# Patient Record
Sex: MALE | Race: WHITE | NOT HISPANIC OR LATINO | URBAN - METROPOLITAN AREA
[De-identification: names, ages, dates, MRNs, and addresses within clinical notes are randomized per-mention and may not be internally consistent; named-entity substitution may affect disease eponyms.]

---

## 2022-08-17 ENCOUNTER — HOSPITAL ENCOUNTER (INPATIENT)
Facility: HOSPITAL | Age: 57
LOS: 3 days | Discharge: HOME | End: 2022-08-20
Attending: INTERNAL MEDICINE | Admitting: INTERNAL MEDICINE

## 2022-08-17 ENCOUNTER — APPOINTMENT (OUTPATIENT)
Dept: MRI IMAGING | Facility: HOSPITAL | Age: 57
End: 2022-08-17

## 2022-08-17 ENCOUNTER — APPOINTMENT (OUTPATIENT)
Dept: CT IMAGING | Facility: HOSPITAL | Age: 57
End: 2022-08-17

## 2022-08-17 PROBLEM — I61.9 ICH (INTRACEREBRAL HEMORRHAGE): Status: ACTIVE | Noted: 2022-08-17

## 2022-08-17 LAB
ALBUMIN SERPL-MCNC: 4.3 G/DL (ref 3.5–5.2)
ALBUMIN/GLOB SERPL: 2 G/DL
ALP SERPL-CCNC: 67 U/L (ref 39–117)
ALT SERPL W P-5'-P-CCNC: 17 U/L (ref 1–41)
ANION GAP SERPL CALCULATED.3IONS-SCNC: 11 MMOL/L (ref 5–15)
AST SERPL-CCNC: 17 U/L (ref 1–40)
BILIRUB SERPL-MCNC: 0.4 MG/DL (ref 0–1.2)
BUN SERPL-MCNC: 14 MG/DL (ref 6–20)
BUN/CREAT SERPL: 17.5 (ref 7–25)
CALCIUM SPEC-SCNC: 8.9 MG/DL (ref 8.6–10.5)
CHLORIDE SERPL-SCNC: 105 MMOL/L (ref 98–107)
CHOLEST SERPL-MCNC: 140 MG/DL (ref 0–200)
CO2 SERPL-SCNC: 27 MMOL/L (ref 22–29)
CREAT SERPL-MCNC: 0.8 MG/DL (ref 0.76–1.27)
DEPRECATED RDW RBC AUTO: 40.9 FL (ref 37–54)
EGFRCR SERPLBLD CKD-EPI 2021: 101.9 ML/MIN/1.73
ERYTHROCYTE [DISTWIDTH] IN BLOOD BY AUTOMATED COUNT: 12.9 % (ref 12.3–15.4)
GLOBULIN UR ELPH-MCNC: 2.1 GM/DL
GLUCOSE BLDC GLUCOMTR-MCNC: 89 MG/DL (ref 70–130)
GLUCOSE SERPL-MCNC: 99 MG/DL (ref 65–99)
HBA1C MFR BLD: 5.5 % (ref 4.8–5.6)
HCT VFR BLD AUTO: 39.2 % (ref 37.5–51)
HDLC SERPL-MCNC: 28 MG/DL (ref 40–60)
HGB BLD-MCNC: 14.1 G/DL (ref 13–17.7)
INR PPP: 1.11 (ref 0.9–1.1)
LDLC SERPL CALC-MCNC: 87 MG/DL (ref 0–100)
LDLC/HDLC SERPL: 3.02 {RATIO}
MAGNESIUM SERPL-MCNC: 2 MG/DL (ref 1.6–2.6)
MCH RBC QN AUTO: 31.8 PG (ref 26.6–33)
MCHC RBC AUTO-ENTMCNC: 36 G/DL (ref 31.5–35.7)
MCV RBC AUTO: 88.3 FL (ref 79–97)
PHOSPHATE SERPL-MCNC: 3.5 MG/DL (ref 2.5–4.5)
PLATELET # BLD AUTO: 186 10*3/MM3 (ref 140–450)
PMV BLD AUTO: 10.1 FL (ref 6–12)
POTASSIUM SERPL-SCNC: 3 MMOL/L (ref 3.5–5.2)
PROT SERPL-MCNC: 6.4 G/DL (ref 6–8.5)
PROTHROMBIN TIME: 14.2 SECONDS (ref 11.7–14.2)
RBC # BLD AUTO: 4.44 10*6/MM3 (ref 4.14–5.8)
SARS-COV-2 ORF1AB RESP QL NAA+PROBE: NOT DETECTED
SODIUM SERPL-SCNC: 143 MMOL/L (ref 136–145)
TRIGL SERPL-MCNC: 137 MG/DL (ref 0–150)
TSH SERPL DL<=0.05 MIU/L-ACNC: 2.56 UIU/ML (ref 0.27–4.2)
VLDLC SERPL-MCNC: 25 MG/DL (ref 5–40)
WBC NRBC COR # BLD: 5.95 10*3/MM3 (ref 3.4–10.8)

## 2022-08-17 PROCEDURE — 82962 GLUCOSE BLOOD TEST: CPT

## 2022-08-17 PROCEDURE — 80061 LIPID PANEL: CPT | Performed by: INTERNAL MEDICINE

## 2022-08-17 PROCEDURE — 99222 1ST HOSP IP/OBS MODERATE 55: CPT | Performed by: PSYCHIATRY & NEUROLOGY

## 2022-08-17 PROCEDURE — 70498 CT ANGIOGRAPHY NECK: CPT

## 2022-08-17 PROCEDURE — 83036 HEMOGLOBIN GLYCOSYLATED A1C: CPT | Performed by: INTERNAL MEDICINE

## 2022-08-17 PROCEDURE — U0004 COV-19 TEST NON-CDC HGH THRU: HCPCS | Performed by: INTERNAL MEDICINE

## 2022-08-17 PROCEDURE — 80053 COMPREHEN METABOLIC PANEL: CPT | Performed by: INTERNAL MEDICINE

## 2022-08-17 PROCEDURE — 0 GADOBENATE DIMEGLUMINE 529 MG/ML SOLUTION: Performed by: INTERNAL MEDICINE

## 2022-08-17 PROCEDURE — 85610 PROTHROMBIN TIME: CPT | Performed by: INTERNAL MEDICINE

## 2022-08-17 PROCEDURE — 83735 ASSAY OF MAGNESIUM: CPT | Performed by: INTERNAL MEDICINE

## 2022-08-17 PROCEDURE — 70496 CT ANGIOGRAPHY HEAD: CPT

## 2022-08-17 PROCEDURE — 84443 ASSAY THYROID STIM HORMONE: CPT | Performed by: INTERNAL MEDICINE

## 2022-08-17 PROCEDURE — 0 IOPAMIDOL PER 1 ML: Performed by: INTERNAL MEDICINE

## 2022-08-17 PROCEDURE — 70553 MRI BRAIN STEM W/O & W/DYE: CPT

## 2022-08-17 PROCEDURE — 25010000002 HYDRALAZINE PER 20 MG: Performed by: INTERNAL MEDICINE

## 2022-08-17 PROCEDURE — 85027 COMPLETE CBC AUTOMATED: CPT | Performed by: INTERNAL MEDICINE

## 2022-08-17 PROCEDURE — 99221 1ST HOSP IP/OBS SF/LOW 40: CPT | Performed by: NURSE PRACTITIONER

## 2022-08-17 PROCEDURE — 84100 ASSAY OF PHOSPHORUS: CPT | Performed by: INTERNAL MEDICINE

## 2022-08-17 PROCEDURE — A9577 INJ MULTIHANCE: HCPCS | Performed by: INTERNAL MEDICINE

## 2022-08-17 RX ORDER — MAGNESIUM SULFATE HEPTAHYDRATE 40 MG/ML
2 INJECTION, SOLUTION INTRAVENOUS AS NEEDED
Status: DISCONTINUED | OUTPATIENT
Start: 2022-08-17 | End: 2022-08-20 | Stop reason: HOSPADM

## 2022-08-17 RX ORDER — ASPIRIN 81 MG/1
81 TABLET, CHEWABLE ORAL DAILY
COMMUNITY

## 2022-08-17 RX ORDER — SODIUM CHLORIDE 0.9 % (FLUSH) 0.9 %
10 SYRINGE (ML) INJECTION EVERY 12 HOURS SCHEDULED
Status: DISCONTINUED | OUTPATIENT
Start: 2022-08-17 | End: 2022-08-20 | Stop reason: HOSPADM

## 2022-08-17 RX ORDER — POTASSIUM CHLORIDE 7.45 MG/ML
10 INJECTION INTRAVENOUS
Status: DISCONTINUED | OUTPATIENT
Start: 2022-08-17 | End: 2022-08-20 | Stop reason: HOSPADM

## 2022-08-17 RX ORDER — NICOTINE POLACRILEX 4 MG
15 LOZENGE BUCCAL
Status: DISCONTINUED | OUTPATIENT
Start: 2022-08-17 | End: 2022-08-20 | Stop reason: HOSPADM

## 2022-08-17 RX ORDER — ONDANSETRON 2 MG/ML
4 INJECTION INTRAMUSCULAR; INTRAVENOUS EVERY 6 HOURS PRN
Status: DISCONTINUED | OUTPATIENT
Start: 2022-08-17 | End: 2022-08-20 | Stop reason: HOSPADM

## 2022-08-17 RX ORDER — ONDANSETRON 4 MG/1
4 TABLET, FILM COATED ORAL EVERY 6 HOURS PRN
Status: DISCONTINUED | OUTPATIENT
Start: 2022-08-17 | End: 2022-08-20 | Stop reason: HOSPADM

## 2022-08-17 RX ORDER — MAGNESIUM SULFATE HEPTAHYDRATE 40 MG/ML
4 INJECTION, SOLUTION INTRAVENOUS AS NEEDED
Status: DISCONTINUED | OUTPATIENT
Start: 2022-08-17 | End: 2022-08-20 | Stop reason: HOSPADM

## 2022-08-17 RX ORDER — AMLODIPINE BESYLATE 10 MG/1
10 TABLET ORAL
Status: DISCONTINUED | OUTPATIENT
Start: 2022-08-17 | End: 2022-08-20 | Stop reason: HOSPADM

## 2022-08-17 RX ORDER — HYDRALAZINE HYDROCHLORIDE 20 MG/ML
20 INJECTION INTRAMUSCULAR; INTRAVENOUS EVERY 6 HOURS PRN
Status: DISCONTINUED | OUTPATIENT
Start: 2022-08-17 | End: 2022-08-20 | Stop reason: HOSPADM

## 2022-08-17 RX ORDER — HYDROCODONE BITARTRATE AND ACETAMINOPHEN 5; 325 MG/1; MG/1
1 TABLET ORAL EVERY 6 HOURS PRN
Status: DISCONTINUED | OUTPATIENT
Start: 2022-08-17 | End: 2022-08-20 | Stop reason: HOSPADM

## 2022-08-17 RX ORDER — POTASSIUM CHLORIDE 1.5 G/1.77G
40 POWDER, FOR SOLUTION ORAL AS NEEDED
Status: DISCONTINUED | OUTPATIENT
Start: 2022-08-17 | End: 2022-08-20 | Stop reason: HOSPADM

## 2022-08-17 RX ORDER — SODIUM CHLORIDE 0.9 % (FLUSH) 0.9 %
10 SYRINGE (ML) INJECTION AS NEEDED
Status: DISCONTINUED | OUTPATIENT
Start: 2022-08-17 | End: 2022-08-20 | Stop reason: HOSPADM

## 2022-08-17 RX ORDER — DEXTROSE MONOHYDRATE 25 G/50ML
25 INJECTION, SOLUTION INTRAVENOUS
Status: DISCONTINUED | OUTPATIENT
Start: 2022-08-17 | End: 2022-08-20 | Stop reason: HOSPADM

## 2022-08-17 RX ORDER — POTASSIUM CHLORIDE 750 MG/1
40 TABLET, FILM COATED, EXTENDED RELEASE ORAL AS NEEDED
Status: DISCONTINUED | OUTPATIENT
Start: 2022-08-17 | End: 2022-08-20 | Stop reason: HOSPADM

## 2022-08-17 RX ADMIN — Medication 10 ML: at 10:03

## 2022-08-17 RX ADMIN — POTASSIUM CHLORIDE 40 MEQ: 750 TABLET, EXTENDED RELEASE ORAL at 06:37

## 2022-08-17 RX ADMIN — POTASSIUM CHLORIDE 40 MEQ: 750 TABLET, EXTENDED RELEASE ORAL at 16:31

## 2022-08-17 RX ADMIN — SODIUM CHLORIDE 5 MG/HR: 9 INJECTION, SOLUTION INTRAVENOUS at 22:58

## 2022-08-17 RX ADMIN — HYDRALAZINE HYDROCHLORIDE 20 MG: 20 INJECTION INTRAMUSCULAR; INTRAVENOUS at 11:36

## 2022-08-17 RX ADMIN — HYDROCODONE BITARTRATE AND ACETAMINOPHEN 1 TABLET: 5; 325 TABLET ORAL at 20:14

## 2022-08-17 RX ADMIN — POTASSIUM CHLORIDE 40 MEQ: 750 TABLET, EXTENDED RELEASE ORAL at 11:39

## 2022-08-17 RX ADMIN — HYDRALAZINE HYDROCHLORIDE 20 MG: 20 INJECTION INTRAMUSCULAR; INTRAVENOUS at 18:39

## 2022-08-17 RX ADMIN — IOPAMIDOL 95 ML: 755 INJECTION, SOLUTION INTRAVENOUS at 04:21

## 2022-08-17 RX ADMIN — AMLODIPINE BESYLATE 10 MG: 10 TABLET ORAL at 10:03

## 2022-08-17 RX ADMIN — GADOBENATE DIMEGLUMINE 18 ML: 529 INJECTION, SOLUTION INTRAVENOUS at 21:19

## 2022-08-17 NOTE — CONSULTS
Stroke Consult Note    Patient Name: Gerry Campos   MRN: [id number]  Age: 57 y.o.  Sex: male  : 1965    Primary Care Physician: Provider, No Known  Referring Physician:  Provider, No Known    Handedness: Right  Race: White    Chief Complaint/Reason for Consultation: Dizziness    Subjective .  HPI: 57-year-old right-handed white male with known diagnosis of hypertension, hyperlipidemia, smoking, stroke few months ago, with residual imbalance walking, on and off dizziness, who comes in with complains of dizziness, but on further questioning it sounds more like lightheadedness, with associated right upper extremity numbness and left lower extremity numbness and pain.  He was at [facility] where he got CT head which showed a possible left insular ICH for which he was transferred to [facility].  Per patient, he has been having some ongoing issues since his stroke few months ago with some occasional imbalance walking lightheadedness, dizziness, and not feeling well.  This time he also complained of some headache, which is also on and off.    Last Known Normal Date/Time: 3 days ago EST     Review of Systems   Constitutional: Positive for fatigue.   Neurological: Positive for dizziness, light-headedness and headaches.   All other systems reviewed and are negative.     Past Medical History:   Diagnosis Date   • Hyperlipidemia    • Hypertension    • Sleep apnea    • Stroke (HCC)      Past Surgical History:   Procedure Laterality Date   • APPENDECTOMY     • BACK SURGERY       History reviewed. No pertinent family history.  Social History     Socioeconomic History   • Marital status: Single   Tobacco Use   • Smoking status: Current Every Day Smoker     Packs/day: 1.00     Years: 15.00     Pack years: 15.00     Types: Cigarettes     Start date: 2006     Allergies   Allergen Reactions   • Ciprofloxacin Rash     Prior to Admission medications    Medication Sig Start Date End Date Taking? Authorizing Provider    aspirin 81 MG chewable tablet Chew 81 mg Daily.    Provider, MD Katerina             Objective     Temp:  [97.6 °F (36.4 °C)-97.8 °F (36.6 °C)] 97.8 °F (36.6 °C)  Heart Rate:  [55-77] 59  BP: (113-151)/(60-98) 151/83  Neurological Exam  Mental Status  Awake, alert and oriented to person, place and time.Alert. Speech is normal. Language is fluent with no aphasia. Attention and concentration are normal.    Cranial Nerves  CN II: Visual fields full to confrontation.  CN III, IV, VI: Extraocular movements intact bilaterally. Normal lids and orbits bilaterally. Pupils equal round and reactive to light bilaterally.  CN V: Facial sensation is normal.  CN VII: Full and symmetric facial movement.  CN VIII: Equal hearing bilaterally.  CN IX, X: Palate elevates symmetrically. Normal gag reflex.  CN XI: Shoulder shrug strength is normal.  CN XII: Tongue midline without atrophy or fasciculations.    Motor  Normal muscle bulk throughout. No fasciculations present. Normal muscle tone.  Subtle right upper extremity weakness, otherwise okay.    Sensory  Decreased to light touch in right upper extremity and left lower extremity.     Reflexes  Deep tendon reflexes are 2+ and symmetric in all four extremities.    Coordination    No dysmetria.    Gait    Not assessed.      Physical Exam  Vitals and nursing note reviewed.   Constitutional:       Appearance: Normal appearance.   HENT:      Head: Normocephalic and atraumatic.      Mouth/Throat:      Mouth: Mucous membranes are moist.      Pharynx: Oropharynx is clear.   Eyes:      General: Lids are normal.      Extraocular Movements: Extraocular movements intact.      Pupils: Pupils are equal, round, and reactive to light.   Cardiovascular:      Rate and Rhythm: Normal rate and regular rhythm.   Pulmonary:      Effort: Pulmonary effort is normal. No respiratory distress.   Musculoskeletal:      Cervical back: Normal range of motion and neck supple.   Neurological:      Mental Status:  He is alert.      Deep Tendon Reflexes: Reflexes are normal and symmetric.   Psychiatric:         Mood and Affect: Mood normal.         Speech: Speech normal.         Behavior: Behavior normal.         Acute Stroke Data    IV Thrombolytic (TPA/Tenecteplase) Inclusion / Exclusion Criteria    Time: 13:16 EDT  Person Administering Scale: Christopher Beltre MD    Inclusion Criteria  [x]   18 years of age or greater   []   Onset of symptoms < 4.5 hours before beginning treatment (stroke onset = time patient was last seen well or without symptoms).   []   Diagnosis of acute ischemic stroke causing measurable disabling deficit (Complete Hemianopia, Any Aphasia, Visual or Sensory Extinction, Any weakness limiting sustained effort against gravity)   []   Any remaining deficit considered potentially disabling in view of patient and practitioner   Exclusion criteria (Do not proceed with Alteplase if any are checked under exclusion criteria)  []   Onset unknown or GREATER than 4.5 hours   [x]   ICH on CT/MRI   []   CT demonstrates hypodensity representing acute or subacute infarct   []   Significant head trauma or prior stroke in the previous 3 months   []   Symptoms suggestive of subarachnoid hemorrhage   []   History of un-ruptured intracranial aneurysm GREATER than 10 mm   []   Recent intracranial or intraspinal surgery within the last 3 months   []   Arterial puncture at a non-compressible site in the previous 7 days   []   Active internal bleeding   []   Acute bleeding tendency   []   Platelet count LESS than 100,000 for known hematological diseases such as leukemia, thrombocytopenia or chronic cirrhosis   []   Current use of anticoagulant with INR GREATER than 1.7 or PT GREATER than 15 seconds, aPTT GREATER than 40 seconds   []   Heparin received within 48 hours, resulting in abnormally elevated aPTT GREATER than upper limit of normal   []   Current use of direct thrombin inhibitors or direct factor Xa inhibitors in the past  48 hours   []   Elevated blood pressure refractory to treatment (systolic GREATER than 185 mm/Hg or diastolic  GREATER than 110 mm/Hg   []   Suspected infective endocarditis and aortic arch dissection   []   Current use of therapeutic treatment dose of low-molecular-weight heparin (LMWH) within the previous 24 hours   []   Structural GI malignancy or bleed   Relative exclusion for all patients  []   Only minor nondisabling symptoms   []   Pregnancy   []   Seizure at onset with postictal residual neurological impairments   []   Major surgery or previous trauma within past 14 days   []   History of previous spontaneous ICH, intracranial neoplasm, or AV malformation   []   Postpartum (within previous 14 days)   []   Recent GI or urinary tract hemorrhage (within previous 21 days)   []   Recent acute MI (within previous 3 months)   []   History of unruptured intracranial aneurysm LESS than 10 mm   []   History of ruptured intracranial aneurysm   []   Blood glucose LESS than 50 mg/dL (2.7 mmol/L)   []   Dural puncture within the last 7 days   []   Known GREATER than 10 cerebral microbleeds   Additional exclusions for patients with symptoms onset between 3 and 4.5 hours.  []   Age > 80.   []   On any anticoagulants regardless of INR  >>> Warfarin (Coumadin), Heparin, Enoxaparin (Lovenox), fondaparinux (Arixtra), bivalirudin (Angiomax), Argatroban, dabigatran (Pradaxa), rivaroxaban (Xarelto), or apixaban (Eliquis)   []   Severe stroke (NIHSS > 25).   []   History of BOTH diabetes and previous ischemic stroke.   []   The risks and benefits have been discussed with the patient or family related to the administration of IV alteplase for stroke symptoms.   []   I have discussed and reviewed the patient's case and imaging with the attending prior to IV Thrombolytic (TPA/Tenecteplase).    Time Thrombolytic administered       Hospital Meds:  Scheduled- amLODIPine, 10 mg, Oral, Q24H  insulin regular, 0-14 Units, Subcutaneous,  Q6H  sodium chloride, 10 mL, Intravenous, Q12H      Infusions- niCARdipine, 5-15 mg/hr       PRNs- dextrose  •  dextrose  •  glucagon (human recombinant)  •  hydrALAZINE  •  magnesium sulfate **OR** magnesium sulfate **OR** magnesium sulfate  •  ondansetron **OR** ondansetron  •  potassium chloride **OR** potassium chloride **OR** potassium chloride  •  potassium phosphate infusion greater than 15 mMoles **OR** potassium phosphate infusion greater than 15 mMoles **OR** potassium phosphate **OR** sodium phosphate IVPB **OR** sodium phosphate IVPB  •  sodium chloride    Functional Status Prior to Current Stroke/Kymberly Score: 2    NIH Stroke Scale  Time: 13:16 EDT  Person Administering Scale: Christopher Beltre MD    1a  Level of consciousness: 0=alert; keenly responsive   1b. LOC questions:  0=Performs both tasks correctly   1c. LOC commands: 0=Performs both tasks correctly   2.  Best Gaze: 0=normal   3.  Visual: 0=No visual loss   4. Facial Palsy: 0=Normal symmetric movement   5a.  Motor left arm: 0=No drift, limb holds 90 (or 45) degrees for full 10 seconds   5b.  Motor right arm: 0=No drift, limb holds 90 (or 45) degrees for full 10 seconds   6a. motor left le=No drift, limb holds 90 (or 45) degrees for full 10 seconds   6b  Motor right le=No drift, limb holds 90 (or 45) degrees for full 10 seconds   7. Limb Ataxia: 0=Absent   8.  Sensory: 1=Mild to moderate sensory loss; patient feels pinprick is less sharp or is dull on the affected side; there is a loss of superficial pain with pinprick but patient is aware He is being touched   9. Best Language:  0=No aphasia, normal   10. Dysarthria: 0=Normal   11. Extinction and Inattention: 0=No abnormality    Total:   1       Results Reviewed:  I have personally reviewed current lab, radiology, and data   CT head shows left posterior insular hyperdensity, which has been stable on 2 CT head.  CT angiogram of head and neck shows left more than right ICA atherosclerosis  at the origin, without any significant stenosis.  Intracranial vessel looks okay.            Assessment/Plan:      1. Intracranial hemorrhage.  Reviewed his CT head which shows some hyperdensity in the left posterior insular region, which could very well be cortical calcification post his stroke.  Recommend to get MRI brain with and without contrast.  Neurosurgery is also involved, who is getting repeat CT head tomorrow morning.  Keep his systolic blood pressure less than 140s.  Okay to continue aspirin 81 mg from my standpoint.  2. Essential hypertension.  Many of his ongoing symptoms could very well be because of fluctuating blood pressures.  Encouraged him to check his blood pressure on a daily basis.  3. Smoking greater than 40 pack years.  Encouraged him to quit smoking completely.  4. Increase activity as tolerated.  5. Okay to transfer out of ICU.    Case was discussed with patient, nursing and will let the primary team know.  Thank you for the consult.          Christopher Beltre MD  August 17, 2022  13:16 EDT

## 2022-08-17 NOTE — H&P
Group: [facility]         CRITICAL CARE ADMISSION    Patient Identification:  Gerry Campos  57 y.o.  male  1965  [id number]              CC: Ataxia, loss of balance, right arm and left leg weakness    History of Present Illness:  56 yo male presents from [facility] in transfer for neurosurgical consultation. He complains of 3 weeks of progressive but intermittent weakness in his RUE and left leg, associated with imbalance and ataxia, but denies fall or head trauma. Has not had double vision nor seizures. Denies chest pain or SOA. Has had previous thalamic stroke. I discussed case over the phone with Dr Jag Ross  He presented there with /95.  Has also been having some headaches on and off for weeks now.  Outside records from [facility] reviewed.  The patient is supposed to be on statin, angiotensin receptor blockers and aspirin.    Review of Systems   Constitutional: Positive for fatigue. Negative for diaphoresis and fever.   HENT: Negative for ear pain and sore throat.    Eyes: Negative for pain and visual disturbance.   Respiratory: Negative for cough and shortness of breath.    Cardiovascular: Negative for chest pain and leg swelling.   Gastrointestinal: Negative for abdominal pain and diarrhea.   Endocrine: Negative for cold intolerance and polyuria.   Genitourinary: Negative for dysuria and hematuria.   Musculoskeletal: Negative for joint swelling and myalgias.   Skin: Negative for rash and wound.   Neurological: Positive for dizziness, weakness and headaches. Negative for speech difficulty and numbness.   Hematological: Negative for adenopathy. Does not bruise/bleed easily.   Psychiatric/Behavioral: Negative for agitation and confusion.       Past Medical History:  Past Medical History:   Diagnosis Date   • Hyperlipidemia    • Hypertension    • Sleep apnea    • Stroke (HCC)    History of rheumatoid arthritis  Chronic shoulder pain  Sleep apnea  Stomach  ulcer  Smoker      Past Surgical History:  Past Surgical History:   Procedure Laterality Date   • APPENDECTOMY     • BACK SURGERY     Splenectomy  Bilateral knee surgery  Right shoulder surgery  Lumbar fusion  Cervical fusion     Home Meds:  Medications Prior to Admission   Medication Sig Dispense Refill Last Dose   • aspirin 81 MG chewable tablet Chew 81 mg Daily.          Allergies:  Allergies   Allergen Reactions   • Ciprofloxacin Rash       Social History:   Social History     Socioeconomic History   • Marital status: Single   Smoker.    Family History:  Positive for hypertension, heart attacks and cancer in father.  Positive for heart disease in mother    Physical Exam:  There were no vitals taken for this visit. There is no height or weight on file to calculate BMI.      Physical Exam  Constitutional:       General: He is not in acute distress.     Appearance: He is well-developed.   HENT:      Right Ear: External ear normal.      Left Ear: External ear normal.      Nose: Nose normal.   Eyes:      General: No scleral icterus.     Conjunctiva/sclera: Conjunctivae normal.      Pupils: Pupils are equal, round, and reactive to light.   Neck:      Thyroid: No thyromegaly.      Vascular: No JVD.   Cardiovascular:      Rate and Rhythm: Normal rate and regular rhythm.      Heart sounds: No murmur heard.     Comments: No edema  Pulmonary:      Effort: Pulmonary effort is normal.      Breath sounds: Rhonchi present. No wheezing or rales.   Abdominal:      General: There is no distension.      Palpations: Abdomen is soft.      Comments: No liver or spleen enlargment palpable   Musculoskeletal:         General: No deformity. Normal range of motion.      Cervical back: Neck supple. No rigidity.      Comments: No deformity in all 4 extrem   Skin:     Findings: No erythema or rash.      Comments: No palpable nodules   Neurological:      General: No focal deficit present.      Mental Status: He is alert and oriented to  person, place, and time.      Cranial Nerves: No cranial nerve deficit.      Motor: No weakness.      Comments: Some dysmetria finger-to-nose assessment.  Gait not assessed since patient was in bed   Psychiatric:         Mood and Affect: Mood normal.         Thought Content: Thought content normal.         LABS:  No results found for: COVID19    Imaging: I could not visualize images of scan performed at outside in the hospital.  Accompanying report states head CT scan images show intracranial hemorrhage within the posterior left insular region, no significant mass-effect, no subarachnoid hemorrhage.      Assessment:  Intracranial hemorrhage left insular region  Dizziness, ataxia and headaches  Hypertension, uncontrolled  Hyperlipidemia  Smoker      Recommendations:  Admit to ICU.  Consult Dr. Lawson Iyer, neurosurgeon.  Start Cardene and maintain systolic blood pressure below 150 mmHg.  Neurochecks every hour following ICU protocol for intracranial hemorrhage.  Avoid all antiplatelets or anticoagulants.  Check blood glucose every 6 hours and correct with sliding scale of insulin.  Obtain CBC, CMP, magnesium, phosphorus and coagulation times.  Repeat CT of the head without contrast now.  Defer decision for antiepileptics to neurosurgery.        Mendoza Lara MD  8/17/2022  02:22 EDT      Much of this encounter note is an electronic transcription/translation of spoken language to printed text using Dragon Software.

## 2022-08-17 NOTE — CONSULTS
"[facility]   Consult Note    Patient Name: Gerry Campos  : 1965  MRN: [id number]  Primary Care Physician:  Provider, No Known  Referring Physician: No Known Provider  Date of admission: 2022    Consults  Subjective   Subjective     Reason for Consult/ Chief Complaint: Intracerebral hemorrhage     History of Present Illness  Gerry Campos is a 57 y.o. male with a history of sleep apnea, hypertension, hyperlipidemia, RA, stomach ulcers.  He also has a history of prior lumbar and cervical fusions. ,  Splenectomy, appendectomy and bilateral knee surgeries.  He was transferred to [facility] from [facility] for intracerebral hemorrhage.  He presented [facility] ER yesterday for complaints of worsening, intermittent headaches for the last 3 weeks.  He had also been experiencing some weakness in his right upper extremity and pain in his right hand.  He was complaining of numbness in the right hand and the last 2 fingers of the right hand as well.  He also had complained of difficulty with balance and dizziness when he stands.  He had been having some left groin discomfort and left anterior thigh pain with standing as well.  He denies any seizures, syncopal episodes, nausea, vomiting, visual loss although he does state that he has been having some intermittent episodes of blurred vision stating \"I need some new glasses.\"  He also denies any episodes of confusion or change in mental status.  He denies any history of falls or trauma.      He was diagnosed with a stroke approximately 6 months ago.  He states that he was treated with Plavix, 81 mg aspirin.  He states that 3 months ago he was instructed by his physician to reduce to 81 mg aspirin only.  He states that he has been compliant with this medication.  He also notes a history of hypertension but he is unsure if he was taking any medications for this.     CT head without contrast performed at [facility] revealed a left " "insular cortex intracerebral hemorrhage with no significant mass-effect.  The patient was transferred to [facility] where CTA of the head and neck were performed.  There has been no change in the appearance of the intracerebral hemorrhage and there is no finding of large vessel occlusion.  Neurosurgery has been asked to evaluate and make further recommendations.      Review of Systems   Constitutional: Positive for activity change.   Eyes: Positive for visual disturbance (blurred vision \"I need to get new glasses\").   Respiratory: Negative.  Negative for cough and shortness of breath.    Cardiovascular: Negative.    Gastrointestinal: Negative.  Negative for abdominal pain, nausea and vomiting.   Endocrine: Negative.    Genitourinary: Negative.  Negative for difficulty urinating.   Musculoskeletal: Positive for arthralgias (chronic R hand/wrist pain) and back pain (Chronic with L groin and anterior thigh pain ).   Skin: Negative.    Allergic/Immunologic: Negative.    Neurological: Positive for dizziness (occurs upon standing), weakness (L sided weeks with stroke 6 mos ago. Placed on Plavix and ASA. Only taking ASA for the last 3 months. ) and headaches (intermittent, over the last several weeks). Negative for tremors, seizures and syncope. Speech difficulty: Intermittent speech difficulty during stroke 6 mos ago - now resolved.    Hematological: Negative.    Psychiatric/Behavioral: Negative.         Personal History     Past Medical History:   Diagnosis Date   • Hyperlipidemia    • Hypertension    • Sleep apnea    • Stroke (HCC)        Past Surgical History:   Procedure Laterality Date   • APPENDECTOMY     • BACK SURGERY         Family History: family history is not on file. Otherwise pertinent FHx was reviewed and not pertinent to current issue.    Social History:  reports that he has been smoking cigarettes. He started smoking about 16 years ago. He has a 15.00 pack-year smoking history. He does not " have any smokeless tobacco history on file. Prior history of cocaine use - quit 20 years ago.     Home Medications:   aspirin    Allergies:  Allergies   Allergen Reactions   • Ciprofloxacin Rash       Objective    Objective     Vitals:  Temp:  [97.6 °F (36.4 °C)] 97.6 °F (36.4 °C)  Heart Rate:  [55-77] 56  BP: (113-147)/(60-98) 132/80    Physical Exam  Vitals reviewed.   Constitutional:       General: He is not in acute distress.     Appearance: Normal appearance. He is well-developed and normal weight. He is not ill-appearing, toxic-appearing or diaphoretic.   HENT:      Head: Normocephalic and atraumatic.      Nose: Nose normal.      Mouth/Throat:      Mouth: Mucous membranes are moist.   Eyes:      General:         Right eye: No discharge.         Left eye: No discharge.      Extraocular Movements: Extraocular movements intact.      Conjunctiva/sclera: Conjunctivae normal.      Pupils: Pupils are equal, round, and reactive to light.   Neck:      Trachea: No tracheal deviation.   Cardiovascular:      Rate and Rhythm: Normal rate.   Pulmonary:      Effort: Pulmonary effort is normal. No respiratory distress.   Abdominal:      General: There is no distension.      Palpations: Abdomen is soft.      Tenderness: There is no abdominal tenderness.   Musculoskeletal:         General: No tenderness. Normal range of motion.      Cervical back: Normal range of motion and neck supple.      Right lower leg: No edema.      Left lower leg: No edema.   Skin:     General: Skin is warm and dry.      Findings: No erythema.   Neurological:      Mental Status: He is alert and oriented to person, place, and time.      GCS: GCS eye subscore is 4. GCS verbal subscore is 5. GCS motor subscore is 6.      Sensory: No sensory deficit.      Motor: No abnormal muscle tone.      Coordination: Coordination normal.      Deep Tendon Reflexes: Reflexes are normal and symmetric. Reflexes normal.      Comments: AA&O x 3.  Speech is normal.  Converses  appropriately.  PERRL. EOM's intact. Face symmetric. Tongue midline without fasiculations or atrophy. Sensation equal and intact throughout the face.  Hearing is intact bilaterally to finger rustle.  Negative pronator drift. Gait not tested due to risk for injury.   No motor or sensory deficits. DTR's normal.   Psychiatric:         Behavior: Behavior is cooperative.         Thought Content: Thought content normal.       Result Review    Result Review:  I have personally reviewed the results from the time of this admission to 8/17/2022 10:44 EDT and agree with these findings:  [x]  Laboratory list / accordion  []  Microbiology  [x]  Radiology  []  EKG/Telemetry   []  Cardiology/Vascular   []  Pathology   []  Old records  []  Other:  Most notable findings include:     CTA HEAD and NECK 8/17/22 0418     Acute curvilinear left insular cortex intraparenchymal hemorrhage no significant mass-effect.  CTA neck reveals less than 50% stenosis of the left cervical ICA secondary to calcified plaque.    .  Results from last 7 days   Lab Units 08/17/22  0321   WBC 10*3/mm3 5.95   HEMOGLOBIN g/dL 14.1   HEMATOCRIT % 39.2   PLATELETS 10*3/mm3 186     .  Results from last 7 days   Lab Units 08/17/22  0321   SODIUM mmol/L 143   POTASSIUM mmol/L 3.0*   CHLORIDE mmol/L 105   CO2 mmol/L 27.0   BUN mg/dL 14   CREATININE mg/dL 0.80   GLUCOSE mg/dL 99   CALCIUM mg/dL 8.9         Assessment & Plan   Assessment / Plan     Brief Patient Summary:  Gerry Campos is a 57 y.o. male who presented to [facility] as transfer from [facility] for c/o dizziness, gait imbalance, weakness/numbness/pain in R arm and L leg (he has a history of prior cervical and lumbar spine surgery). CT head at [facility] revealed findings of small L inuslar cortex ICH. He has a history of prior stroke 6 months ago. He was treated with 81 mg ASA and plavix daily for 3 months. As of 3 mos ago, treatment was reduced to daily 81 mg ASA only. He  takes no other medication. He was hypertensive on arrival with 's. He is currently on Cardene drip for BP management in the ICU.     Active Hospital Problems:  Active Hospital Problems    Diagnosis    • ICH (intracerebral hemorrhage) (HCC)    • Hypertension    • History of Stroke (HCC)      Plan:      Continue to observe non-operatively  Consult neurology due to recent history of stroke  Manage BP; Cardene to keep SBP < 160.   Recheck head CT in am  Consult PT/OT    CONSTANCE Tidwell

## 2022-08-18 ENCOUNTER — APPOINTMENT (OUTPATIENT)
Dept: CT IMAGING | Facility: HOSPITAL | Age: 57
End: 2022-08-18

## 2022-08-18 LAB
ANION GAP SERPL CALCULATED.3IONS-SCNC: 10 MMOL/L (ref 5–15)
BUN SERPL-MCNC: 15 MG/DL (ref 6–20)
BUN/CREAT SERPL: 21.4 (ref 7–25)
CALCIUM SPEC-SCNC: 8.9 MG/DL (ref 8.6–10.5)
CHLORIDE SERPL-SCNC: 107 MMOL/L (ref 98–107)
CO2 SERPL-SCNC: 23 MMOL/L (ref 22–29)
CREAT SERPL-MCNC: 0.7 MG/DL (ref 0.76–1.27)
DEPRECATED RDW RBC AUTO: 43.5 FL (ref 37–54)
EGFRCR SERPLBLD CKD-EPI 2021: 106.1 ML/MIN/1.73
ERYTHROCYTE [DISTWIDTH] IN BLOOD BY AUTOMATED COUNT: 13.1 % (ref 12.3–15.4)
GLUCOSE BLDC GLUCOMTR-MCNC: 100 MG/DL (ref 70–130)
GLUCOSE SERPL-MCNC: 100 MG/DL (ref 65–99)
HCT VFR BLD AUTO: 42.5 % (ref 37.5–51)
HGB BLD-MCNC: 14.4 G/DL (ref 13–17.7)
MCH RBC QN AUTO: 31.1 PG (ref 26.6–33)
MCHC RBC AUTO-ENTMCNC: 33.9 G/DL (ref 31.5–35.7)
MCV RBC AUTO: 91.8 FL (ref 79–97)
PLATELET # BLD AUTO: 207 10*3/MM3 (ref 140–450)
PMV BLD AUTO: 10.3 FL (ref 6–12)
POTASSIUM SERPL-SCNC: 3.6 MMOL/L (ref 3.5–5.2)
RBC # BLD AUTO: 4.63 10*6/MM3 (ref 4.14–5.8)
SODIUM SERPL-SCNC: 140 MMOL/L (ref 136–145)
WBC NRBC COR # BLD: 6.47 10*3/MM3 (ref 3.4–10.8)

## 2022-08-18 PROCEDURE — 85027 COMPLETE CBC AUTOMATED: CPT | Performed by: INTERNAL MEDICINE

## 2022-08-18 PROCEDURE — 82962 GLUCOSE BLOOD TEST: CPT

## 2022-08-18 PROCEDURE — 97162 PT EVAL MOD COMPLEX 30 MIN: CPT

## 2022-08-18 PROCEDURE — 97530 THERAPEUTIC ACTIVITIES: CPT

## 2022-08-18 PROCEDURE — 97110 THERAPEUTIC EXERCISES: CPT

## 2022-08-18 PROCEDURE — 80048 BASIC METABOLIC PNL TOTAL CA: CPT | Performed by: INTERNAL MEDICINE

## 2022-08-18 PROCEDURE — 99232 SBSQ HOSP IP/OBS MODERATE 35: CPT | Performed by: NURSE PRACTITIONER

## 2022-08-18 PROCEDURE — 70450 CT HEAD/BRAIN W/O DYE: CPT

## 2022-08-18 PROCEDURE — 25010000002 HYDRALAZINE PER 20 MG: Performed by: INTERNAL MEDICINE

## 2022-08-18 PROCEDURE — 97165 OT EVAL LOW COMPLEX 30 MIN: CPT

## 2022-08-18 PROCEDURE — 99233 SBSQ HOSP IP/OBS HIGH 50: CPT | Performed by: PSYCHIATRY & NEUROLOGY

## 2022-08-18 RX ORDER — ATORVASTATIN CALCIUM 20 MG/1
40 TABLET, FILM COATED ORAL NIGHTLY
Status: DISCONTINUED | OUTPATIENT
Start: 2022-08-18 | End: 2022-08-20 | Stop reason: HOSPADM

## 2022-08-18 RX ORDER — ASPIRIN 81 MG/1
81 TABLET ORAL DAILY
Status: DISCONTINUED | OUTPATIENT
Start: 2022-08-18 | End: 2022-08-20 | Stop reason: HOSPADM

## 2022-08-18 RX ORDER — LISINOPRIL 10 MG/1
10 TABLET ORAL
Status: DISCONTINUED | OUTPATIENT
Start: 2022-08-18 | End: 2022-08-20 | Stop reason: HOSPADM

## 2022-08-18 RX ADMIN — ASPIRIN 81 MG: 81 TABLET, COATED ORAL at 17:53

## 2022-08-18 RX ADMIN — HYDRALAZINE HYDROCHLORIDE 20 MG: 20 INJECTION INTRAMUSCULAR; INTRAVENOUS at 18:05

## 2022-08-18 RX ADMIN — SODIUM CHLORIDE 5 MG/HR: 9 INJECTION, SOLUTION INTRAVENOUS at 18:36

## 2022-08-18 RX ADMIN — SODIUM CHLORIDE 10 MG/HR: 9 INJECTION, SOLUTION INTRAVENOUS at 01:30

## 2022-08-18 RX ADMIN — HYDROCODONE BITARTRATE AND ACETAMINOPHEN 1 TABLET: 5; 325 TABLET ORAL at 11:16

## 2022-08-18 RX ADMIN — LISINOPRIL 10 MG: 10 TABLET ORAL at 10:41

## 2022-08-18 RX ADMIN — ATORVASTATIN CALCIUM 40 MG: 20 TABLET, FILM COATED ORAL at 20:56

## 2022-08-18 RX ADMIN — AMLODIPINE BESYLATE 10 MG: 10 TABLET ORAL at 08:26

## 2022-08-18 RX ADMIN — SODIUM CHLORIDE 5 MG/HR: 9 INJECTION, SOLUTION INTRAVENOUS at 21:08

## 2022-08-18 RX ADMIN — Medication 10 ML: at 20:57

## 2022-08-18 RX ADMIN — HYDROCODONE BITARTRATE AND ACETAMINOPHEN 1 TABLET: 5; 325 TABLET ORAL at 20:56

## 2022-08-18 RX ADMIN — Medication 10 ML: at 08:26

## 2022-08-18 NOTE — PROGRESS NOTES
NEUROSURGERY PROGRESS NOTE     LOS: 1 day   Patient Care Team:  Provider, No Known as PCP - General    Chief Complaint:  Dizziness. F/U visit for ICH  Subjective       Interval History: Continues to c/o dizziness with position change. Denies nausea or vomiting. Denies headache. Neurology saw patient yesterday and ordered MRI brain.     History taken from: patient chart    Objective      Vital Signs  Temp:  [97.8 °F (36.6 °C)-98.7 °F (37.1 °C)] 98.7 °F (37.1 °C)  Heart Rate:  [55-89] 85  Resp:  [14] 14  BP: (106-174)/(50-93) 134/82     Physical Exam:    AA&O x 3. PERRL.   Face symmetric, tongue midline. Speech normal.   Follows commands x 4 extremities.   No drift. No motor or sensory deficits.        Results Review:     I reviewed the patient's new clinical results.  I reviewed the patient's new imaging results and agree with the interpretation.  CT Head and MRI brain results reviewed with Dr. Melvin and also discussed with the patient at bedside.        CT HEAD WO CONTRAST 8/18/22    The hemorrhage in the left insular cortex is stable and unchanged from yesterday's CT.    MRI BRAIN W/WO CONTRAST 8/17/22    There is a large subacute left temporal hemorrhage with mild surrounding edema is stable and unchanged measuring 8 x 8 x 6 mm.  This is likely hemorrhagic conversion from an infarct.  There are also foci of non-hemorrhagic acute infarcts located in the right temporal and left cerebellar regions.      Assessment & Plan         ICH (intracerebral hemorrhage) (HCC)    Hypertension    History of Stroke (HCC)      Nothing to offer from neurosurgical standpoint.   Neurology following and managing stroke.   No neurosurgical follow up needed. Please call for questions.       Elizabeth Donovan, CONSTANCE  08/18/22  09:44 EDT      ADDENDUM:     Spoke with Dr. Melvin. He has approved patient to start ASA now but would prefer waiting until post hemorrhage day 5 to start Plavix. This would be August 22nd. I informed Dr. Beltre with  stroke neurology of these recommendations as well.

## 2022-08-18 NOTE — PLAN OF CARE
Goal Outcome Evaluation:    Patient is a pleasant 57 y.o. male admitted to [facility] for Acute to subacute multifocal multiple vascular territorial strokes and ICH on 8/17/2022. PMHx includes thalamic CVA. Patient is independent at baseline and lives at home alone- no prior use of AD. SV. Today, patient performed bed mobility with SV, required CGA-Tramaine for transfers, and ambulated CGA with a RW- unsteady without AD and currently recommend use of RW to ensure safety. Balance deficits noted. Patient may benefit from skilled PT services acutely to address functional deficits as well as improve level of independence prior to discharge. Anticipate patient returning home with  PT or OPPT to further address balance impairments upon DC.

## 2022-08-18 NOTE — PROGRESS NOTES
Stroke Progress Note       Chief Complaint: Dizziness    Subjective    Subjective     Subjective:  No acute issues overnight.  Patient continues to have left leg pain, and ongoing lightheadedness which has been ongoing since his stroke few months ago.  Denies any headache this morning.    Review of Systems   Constitutional: Positive for fatigue.   Neurological: Positive for dizziness, light-headedness.    All other systems reviewed and are negative.        Objective    Objective      Temp:  [97.8 °F (36.6 °C)-98.7 °F (37.1 °C)] 98.6 °F (37 °C)  Heart Rate:  [55-90] 72  Resp:  [14] 14  BP: (106-174)/(50-93) 144/83    Neurological Exam  Mental Status  Awake, alert and oriented to person, place and time.Alert. Speech is normal. Language is fluent with no aphasia. Attention and concentration are normal.     Cranial Nerves  CN II: Visual fields full to confrontation.  CN III, IV, VI: Extraocular movements intact bilaterally. Normal lids and orbits bilaterally. Pupils equal round and reactive to light bilaterally.  CN V: Facial sensation is normal.  CN VII: Full and symmetric facial movement.  CN VIII: Equal hearing bilaterally.  CN IX, X: Palate elevates symmetrically. Normal gag reflex.  CN XI: Shoulder shrug strength is normal.  CN XII: Tongue midline without atrophy or fasciculations.     Motor  Normal muscle bulk throughout. No fasciculations present. Normal muscle tone.  Subtle right upper extremity weakness, otherwise okay.     Sensory  Decreased to light touch in right upper extremity and left lower extremity.      Reflexes  Deep tendon reflexes are 2+ and symmetric in all four extremities.     Coordination     No dysmetria.     Gait     Gait was broad-based, small steps, requiring help.        Physical Exam  Vitals and nursing note reviewed.   Constitutional:       Appearance: Normal appearance.   HENT:      Head: Normocephalic and atraumatic.      Mouth/Throat:      Mouth: Mucous membranes are moist.       Pharynx: Oropharynx is clear.   Eyes:      General: Lids are normal.      Extraocular Movements: Extraocular movements intact.      Pupils: Pupils are equal, round, and reactive to light.   Cardiovascular:      Rate and Rhythm: Normal rate and regular rhythm.   Pulmonary:      Effort: Pulmonary effort is normal. No respiratory distress.   Musculoskeletal:      Cervical back: Normal range of motion and neck supple.   Neurological:      Mental Status: He is alert.      Deep Tendon Reflexes: Reflexes are normal and symmetric.   Psychiatric:         Mood and Affect: Mood normal.         Speech: Speech normal.         Behavior: Behavior normal.     Results Review:    I reviewed the patient's new clinical results.  MRI brain shows him to have pretty extensive white matter disease, in addition to a small acute right temporal lobe stroke, subacute left cerebellar stroke and left temporal ICH.    Labs were reviewed.              Assessment/Plan     Assessment/Plan:  57-year-old right-handed white male with known diagnosis of hypertension, hyperlipidemia, smoking, stroke few months ago, with residual imbalance walking, on and off dizziness, who comes in with complains of dizziness, but on further questioning it sounds more like lightheadedness, with associated right upper extremity numbness and left lower extremity numbness and pain.      1. Acute to subacute multifocal multiple vascular territorial strokes.  Patient likely has multiple lacunar events, in addition to his extensive white matter disease, secondary to uncontrolled risk factors.  Recommend to start him on aspirin 81 mg, Plavix 75 mg daily if okay with neurosurgery.  Start him on 40 mg of Lipitor daily as well.  Get a 2D echocardiogram.  Zio patch on discharge to look for other cardiac sources of emboli.  2. Intracranial hemorrhage.  Likely hemorrhagic transformation of a small stroke.  Continue to keep his blood pressures less than 140s.  Start antiplatelet  agents when okay with neurosurgery.  3. Essential hypertension.  Likely cause for his multiple symptoms.  Encouraged him to check blood pressure on a daily basis.  Goal systolic blood pressure of 100-1 40s.  4. Smoking greater than 40 pack years.  Encouraged him to quit smoking.  5. Okay to transfer him out of ICU.    Case was discussed with patient, nursing and will let the primary team know.  Thank you for the consult.      Christopher Beltre MD  08/18/22  11:44 EDT

## 2022-08-18 NOTE — PROGRESS NOTES
[facility] INPATIENT PROGRESS NOTE         [facility] INTENSIVE CARE    2022      PATIENT IDENTIFICATION:  Name: Gerry Campos ADMIT: 2022   : 1965  PCP: Provider, No Known    MRN: [id number] LOS: 1 days   AGE/SEX: 57 y.o. male  ROOM: [facility]                     LOS 1    Reason for visit: Intracranial hemorrhage      SUBJECTIVE:      Comfortably.  Required Cardene overnight but is off now.  Discussed with nursing staff.  Complains of left leg weakness and some numbness in his right hand.    Objective   OBJECTIVE:    Vital Sign Min/Max for last 24 hours  Temp  Min: 97.8 °F (36.6 °C)  Max: 98.7 °F (37.1 °C)   BP  Min: 106/57  Max: 174/50   Pulse  Min: 55  Max: 89   Resp  Min: 14  Max: 14   SpO2  Min: 85 %  Max: 100 %   No data recorded   Weight  Min: 88.5 kg (195 lb)  Max: 89.1 kg (196 lb 6.9 oz)                         Body mass index is 27.41 kg/m².    Intake/Output Summary (Last 24 hours) at 2022 0841  Last data filed at 2022 0500  Gross per 24 hour   Intake 350 ml   Output 1850 ml   Net -1500 ml         Exam:  GEN:  No distress, appears stated age  EYES:   PERRL, anicteric sclerae  ENT:    External ears/nose normal, OP clear  NECK:  No adenopathy, midline trachea  LUNGS: Normal chest on inspection, palpation and auscultation  CV:  Normal S1S2, without murmur  ABD:  Nontender, nondistended, no hepatosplenomegaly, +BS  EXT:  No edema.  No cyanosis or clubbing.  No mottling and normal cap refill.  Left leg weakness    Assessment     Scheduled meds:  amLODIPine, 10 mg, Oral, Q24H  insulin regular, 0-14 Units, Subcutaneous, Q6H  sodium chloride, 10 mL, Intravenous, Q12H      IV meds:                      niCARdipine, 5-15 mg/hr, Last Rate: Stopped (22 0300)      Data Review:  Results from last 7 days   Lab Units 22  0431 22  0321   SODIUM mmol/L 140 143   POTASSIUM mmol/L 3.6 3.0*   CHLORIDE mmol/L 107 105   CO2 mmol/L 23.0 27.0   BUN mg/dL 15 14   CREATININE  mg/dL 0.70* 0.80   GLUCOSE mg/dL 100* 99   CALCIUM mg/dL 8.9 8.9         Estimated Creatinine Clearance: 143.2 mL/min (A) (by C-G formula based on SCr of 0.7 mg/dL (L)).  Results from last 7 days   Lab Units 08/18/22  0431 08/17/22  0321   WBC 10*3/mm3 6.47 5.95   HEMOGLOBIN g/dL 14.4 14.1   PLATELETS 10*3/mm3 207 186     Results from last 7 days   Lab Units 08/17/22  0321   INR  1.11*     Results from last 7 days   Lab Units 08/17/22  0321   ALT (SGPT) U/L 17   AST (SGOT) U/L 17                 Hemoglobin A1C   Date/Time Value Ref Range Status   08/17/2022 0321 5.50 4.80 - 5.60 % Final     Glucose   Date/Time Value Ref Range Status   08/18/2022 0004 100 70 - 130 mg/dL Final     Comment:     Meter: [id number] : LARRY Stratton RN   08/17/2022 1128 89 70 - 130 mg/dL Final     Comment:     Meter: [id number] : [id number] Immanuel ARDON     CT head 8/18 reviewed: Shows similar hemorrhage in left periventricular white matter.  No herniation.        Microbiology reviewed                Active Hospital Problems    Diagnosis  POA   • ICH (intracerebral hemorrhage) (HCC) [I61.9]  Yes   • Hypertension [I10]  Yes   • History of Stroke (HCC) [I63.9]  Yes      Resolved Hospital Problems   No resolved problems to display.         ASSESSMENT:    Intracranial hemorrhage left insular region  Dizziness, ataxia and headaches  Hypertension, uncontrolled  Hyperlipidemia  Smoker          PLAN:    Control blood pressure.  Add additional p.o. blood pressure medications and wean off Cardene.  Control glucose.  MRI and repeat CT head reviewed.  Aspirin and statin for secondary stroke prevention when okay with neurology and neurosurgery service.  Mechanical DVT prophylaxis.  Can transfer out of intensive care from my standpoint.  PT/OT    Discussed with multidisciplinary ICU team on rounds this morning.      Dylan Burton MD  Pulmonary and Critical Care Medicine  [facility], [facility]  8/18/2022    08:41  EDT

## 2022-08-18 NOTE — PROGRESS NOTES
Discharge Planning Assessment  [facility]     Patient Name: Gerry Campos  MRN: [id number]  Today's Date: 8/18/2022    Admit Date: 8/17/2022     Discharge Needs Assessment     Row Name 08/18/22 1027       Living Environment    People in Home alone    Current Living Arrangements home    Potentially Unsafe Housing Conditions unable to assess    Primary Care Provided by self    Provides Primary Care For no one    Family Caregiver if Needed child(santino), adult    Quality of Family Relationships unable to assess    Able to Return to Prior Arrangements yes       Resource/Environmental Concerns    Resource/Environmental Concerns none    Transportation Concerns none       Transition Planning    Patient/Family Anticipates Transition to home    Patient/Family Anticipated Services at Transition other (see comments)    Transportation Anticipated family or friend will provide       Discharge Needs Assessment    Current Outpatient/Agency/Support Group other (see comments)    Equipment Currently Used at Home none    Concerns to be Addressed home safety    Anticipated Changes Related to Illness other (see comments)    Equipment Needed After Discharge other (see comments)    Provided Post Acute Provider List? Refused    Provided Post Acute Provider Quality & Resource List? Refused               Discharge Plan     Row Name 08/18/22 1029       Plan    Plan Home  Pt declines all referrals    Plan Comments CCP spoke to patient at bedside.  Face sheet verified.  CCP role explained and discharge planning discussed.  His son Veronica, [phone] his emergency contact.   Pt‘s PCP is Dr. Dr. Oli Castellanos.   Pt lives in a mobile home alone.      He is independent with ADL’s.  He uses no DME to ambulate.  He obtains his medications from [facility].  He has no history of Home Health.   He has no past rehab stays.  Plan is undetermined.  Pt states he can go home and care for himself.   CCP discussed home health;  patient declined a referral.  CCP discussed acute rehab; patient declined a referral.   Spoke to patient about home safety.  He agrees to speak with his son.  CCP following for therapy evaluations              Continued Care and Services - Admitted Since 8/17/2022     Destination     Service Provider Request Status Selected Services Address Phone Fax Patient Preferred    [facility]  Pending - No Request Sent N/A [address] [facility] [id number] [phone] -- --              Expected Discharge Date and Time     Expected Discharge Date Expected Discharge Time    Aug 22, 2022          Demographic Summary    No documentation.                Functional Status    No documentation.                Psychosocial    No documentation.                Abuse/Neglect    No documentation.                Legal    No documentation.                Substance Abuse    No documentation.                Patient Forms    No documentation.                   Olya Sands RN

## 2022-08-18 NOTE — THERAPY EVALUATION
Patient Name: Gerry Campos  : 1965    MRN: [id number]                              Today's Date: 2022       Admit Date: 2022    Visit Dx: No diagnosis found.  Patient Active Problem List   Diagnosis   • ICH (intracerebral hemorrhage) (HCC)   • Hypertension   • History of Stroke (HCC)     Past Medical History:   Diagnosis Date   • Hyperlipidemia    • Hypertension    • Sleep apnea    • Stroke (HCC)      Past Surgical History:   Procedure Laterality Date   • APPENDECTOMY     • BACK SURGERY        General Information     Row Name 22 1327          Physical Therapy Time and Intention    Document Type evaluation  -MS     Mode of Treatment physical therapy  -MS     Row Name 22 1327          General Information    Patient Profile Reviewed yes  -MS     Prior Level of Function independent:;all household mobility  hx of balance issues, reported diff with coordination and balance PTA for unknown reason  -MS     Existing Precautions/Restrictions fall  -MS     Barriers to Rehab medically complex  -MS     Row Name 22 1327          Living Environment    People in Home alone  -MS     Row Name 22 1327          Cognition    Orientation Status (Cognition) oriented x 4  -MS     Row Name 22 1327          Safety Issues, Functional Mobility    Safety Issues Affecting Function (Mobility) positioning of assistive device;sequencing abilities;awareness of need for assistance;judgment  -MS     Impairments Affecting Function (Mobility) balance;endurance/activity tolerance;strength;postural/trunk control;range of motion (ROM)  -MS     Comment, Safety Issues/Impairments (Mobility) Gait belt and non skid socks donned.  -MS           User Key  (r) = Recorded By, (t) = Taken By, (c) = Cosigned By    Initials Name Provider Type    MS Olya Cosby PT Physical Therapist               Mobility     Row Name 22 1329          Bed Mobility    Bed Mobility supine-sit;sit-supine  -MS      Supine-Sit [place] (Bed Mobility) supervision;verbal cues  -MS     Assistive Device (Bed Mobility) bed rails;head of bed elevated  -MS     Comment, (Bed Mobility) SV for sitting balance.  -MS     Row Name 08/18/22 1329          Sit-Stand Transfer    Sit-Stand [place] (Transfers) verbal cues;nonverbal cues (demo/gesture);contact guard;minimum assist (75% patient effort)  -MS     Assistive Device (Sit-Stand Transfers) walker, front-wheeled  -MS     Row Name 08/18/22 1329          Gait/Stairs (Locomotion)    [place] Level (Gait) contact guard;verbal cues;nonverbal cues (demo/gesture)  -MS     Assistive Device (Gait) walker, front-wheeled  -MS     Distance in Feet (Gait) 10', 55'  -MS     Deviations/Abnormal Patterns (Gait) sarah decreased;gait speed decreased;ataxic  -MS     Comment, (Gait/Stairs) Unsteady throughout, implemented RW and balance improved, cues needed for placement, agreeable to sitting UI  -MS           User Key  (r) = Recorded By, (t) = Taken By, (c) = Cosigned By    Initials Name Provider Type    MS Olya Cosby, PT Physical Therapist               Obj/Interventions     Row Name 08/18/22 1330          Range of Motion Comprehensive    Comment, General Range of Motion B LEs grossly WFL  -MS     Row Name 08/18/22 1330          Strength Comprehensive (MMT)    Comment, General Manual Muscle Testing (MMT) Assessment B LEs grossly at least 3/5  -MS     Row Name 08/18/22 1330          Motor Skills    Therapeutic Exercise --  Seated LAQs x 10 reps  -MS     Row Name 08/18/22 1330          Balance    Balance Assessment sitting static balance;standing static balance  -MS     Static Sitting Balance supervision  -MS     Position, Sitting Balance sitting edge of bed  -MS     Static Standing Balance contact guard;minimal assist  -MS     Position/Device Used, Standing Balance supported;walker, rolling  -MS     Row Name 08/18/22 1330          Sensory Assessment (Somatosensory)    Sensory  Assessment (Somatosensory) sensation intact  -MS           User Key  (r) = Recorded By, (t) = Taken By, (c) = Cosigned By    Initials Name Provider Type    MS CosbyOlya, PT Physical Therapist               Goals/Plan     Row Name 08/18/22 1332          Bed Mobility Goal 1 (PT)    Activity/Assistive Device (Bed Mobility Goal 1, PT) bed mobility activities, all  -MS     [place] Level/Cues Needed (Bed Mobility Goal 1, PT) independent  -MS     Time Frame (Bed Mobility Goal 1, PT) 1 week  -MS     Row Name 08/18/22 1332          Transfer Goal 1 (PT)    Activity/Assistive Device (Transfer Goal 1, PT) transfers, all  -MS     [place] Level/Cues Needed (Transfer Goal 1, PT) supervision required  -MS     Time Frame (Transfer Goal 1, PT) 1 week  -MS     Row Name 08/18/22 1332          Gait Training Goal 1 (PT)    Activity/Assistive Device (Gait Training Goal 1, PT) gait (walking locomotion)  -MS     [place] Level (Gait Training Goal 1, PT) supervision required  -MS     Distance (Gait Training Goal 1, PT) 150'  -MS     Time Frame (Gait Training Goal 1, PT) 1 week  -MS     Row Name 08/18/22 1332          Therapy Assessment/Plan (PT)    Planned Therapy Interventions (PT) balance training;bed mobility training;gait training;home exercise program;postural re-education;patient/family education;ROM (range of motion);stair training;strengthening;transfer training  -MS           User Key  (r) = Recorded By, (t) = Taken By, (c) = Cosigned By    Initials Name Provider Type    MS CosbyOlya, PT Physical Therapist               Clinical Impression     Row Name 08/18/22 1331          Pain    Pretreatment Pain Rating 0/10 - no pain  -MS     Row Name 08/18/22 1331          Therapy Assessment/Plan (PT)    Rehab Potential (PT) good, to achieve stated therapy goals  -MS     Criteria for Skilled Interventions Met (PT) yes;meets criteria  -MS     Therapy Frequency (PT) 5 times/wk  -MS     Row Name 08/18/22 1331           Vital Signs    O2 Delivery Pre Treatment room air  -MS     Row Name 08/18/22 1331          Positioning and Restraints    Pre-Treatment Position in bed  -MS     Post Treatment Position chair  -MS     In Chair reclined;call light within reach;encouraged to call for assist;exit alarm on  -MS           User Key  (r) = Recorded By, (t) = Taken By, (c) = Cosigned By    Initials Name Provider Type    MS CosbyOlya, PT Physical Therapist               Outcome Measures     Row Name 08/18/22 1331          How much help from another person do you currently need...    Turning from your back to your side while in flat bed without using bedrails? 4  -MS     Moving from lying on back to sitting on the side of a flat bed without bedrails? 3  -MS     Moving to and from a bed to a chair (including a wheelchair)? 3  -MS     Standing up from a chair using your arms (e.g., wheelchair, bedside chair)? 3  -MS     Climbing 3-5 steps with a railing? 1  -MS     To walk in hospital room? 3  -MS     AM-PAC 6 Clicks Score (PT) 17  -MS     Highest level of mobility 5 --> Static standing  -MS     Row Name 08/18/22 1331          Functional Assessment    Outcome Measure Options AM-PAC 6 Clicks Basic Mobility (PT)  -MS           User Key  (r) = Recorded By, (t) = Taken By, (c) = Cosigned By    Initials Name Provider Type    MS LagossOlya, PT Physical Therapist                               PT Recommendation and Plan  Planned Therapy Interventions (PT): balance training, bed mobility training, gait training, home exercise program, postural re-education, patient/family education, ROM (range of motion), stair training, strengthening, transfer training        Time Calculation:    PT Charges     Row Name 08/18/22 1327             Time Calculation    Start Time 0938  -MS      Stop Time 0953  -MS      Time Calculation (min) 15 min  -MS      PT Received On 08/18/22  -MS      PT - Next Appointment 08/19/22  -MS      PT Goal Re-Cert Due  Date 08/25/22  -MS              Time Calculation- PT    Total Timed Code Minutes- PT 10 minute(s)  -MS            User Key  (r) = Recorded By, (t) = Taken By, (c) = Cosigned By    Initials Name Provider Type    Olya Galindo, PT Physical Therapist              Therapy Charges for Today     Code Description Service Date Service Provider Modifiers Qty    23566411137  PT EVAL MOD COMPLEXITY 2 8/18/2022 Olya Cosby, PT GP 1    [id number]  PT THER PROC EA 15 MIN 8/18/2022 Olya Cosby, PT GP 1          PT G-Codes  Outcome Measure Options: AM-PAC 6 Clicks Basic Mobility (PT)  AM-PAC 6 Clicks Score (PT): 17    Olya Cosby, PT  8/18/2022

## 2022-08-18 NOTE — DISCHARGE PLACEMENT REQUEST
"Gerry Parks (57 y.o. Male)             Date of Birth   1965    Social Security Number       Address   [address] [place] [address]    Home Phone   [phone]    MRN   [id number]       Christian   Unknown    Marital Status   Single                            Admission Date   8/17/22    Admission Type   Urgent    Admitting Provider   Mendoza Lara MD    Attending Provider   Mendoza Lara MD    Department, Room/Bed   [facility] INTENSIVE CARE, I374/1       Discharge Date       Discharge Disposition       Discharge Destination                               Attending Provider: Mendoza Lara MD    Allergies: Ciprofloxacin    Isolation: None   Infection: None   Code Status: CPR   Advance Care Planning Activity    Ht: 180.3 cm (71\")   Wt: 89.1 kg (196 lb 6.9 oz)    Admission Cmt: None   Principal Problem: None                Active Insurance as of 8/17/2022     Primary Coverage     Payor Plan Insurance Group Employer/Plan Group    [place] [employer] MEDICARE REPLACEMENT [employer]IONS MEDICARE REPLACEMENT [id number]     Payor Plan Address Payor Plan Phone Number Payor Plan Fax Number Effective Dates    [facility] [phone]  8/1/2022 - None Entered    [address]       [facility] [address]       Subscriber Name Subscriber Birth Date Member ID       GERRY PARKS 1965 [id number]           Secondary Coverage     Payor Plan Insurance Group Employer/Plan Group    [place]      Payor Plan Address Payor Plan Phone Number Payor Plan Fax Number Effective Dates    [address]   1/1/2021 - None Entered    [place] [address]       Subscriber Name Subscriber Birth Date Member ID       GERRY PARKS 1965 [id number]                 Emergency Contacts      (Rel.) Home Phone Work Phone Mobile Phone    Roosevelt Parks (Son) -- -- [phone]              "

## 2022-08-18 NOTE — THERAPY EVALUATION
Patient Name: Gerry Campos  : 1965    MRN: [id number]                              Today's Date: 2022       Admit Date: 2022    Visit Dx: No diagnosis found.  Patient Active Problem List   Diagnosis   • ICH (intracerebral hemorrhage) (HCC)   • Hypertension   • History of Stroke (HCC)     Past Medical History:   Diagnosis Date   • Hyperlipidemia    • Hypertension    • Sleep apnea    • Stroke (HCC)      Past Surgical History:   Procedure Laterality Date   • APPENDECTOMY     • BACK SURGERY        General Information     Row Name 22 1338          OT Time and Intention    Document Type evaluation  -KN     Mode of Treatment occupational therapy  -     Row Name 22 1338          General Information    Patient Profile Reviewed yes  -KN     Existing Precautions/Restrictions fall  -     Barriers to Rehab medically complex  -     Row Name 22 1338          Living Environment    People in Home alone  -     Row Name 22 1338          Cognition    Orientation Status (Cognition) oriented x 4  -     Row Name 22 133          Safety Issues, Functional Mobility    Impairments Affecting Function (Mobility) balance;endurance/activity tolerance;strength;postural/trunk control;range of motion (ROM)  -           User Key  (r) = Recorded By, (t) = Taken By, (c) = Cosigned By    Initials Name Provider Type    Stu Riddle OT Occupational Therapist                 Mobility/ADL's     Row Name 22 1339          Bed Mobility    Bed Mobility supine-sit;sit-supine  -KN     Supine-Sit [place] (Bed Mobility) supervision;verbal cues  -KN     Sit-Supine [place] (Bed Mobility) supervision;verbal cues  -KN     Assistive Device (Bed Mobility) bed rails;head of bed elevated  -     Row Name 22 1339          Transfers    Transfers stand-sit transfer;sit-stand transfer  -KN     Sit-Stand [place] (Transfers) verbal cues;nonverbal cues (demo/gesture);contact  guard;minimum assist (75% patient effort)  -     Stand-Sit [place] (Transfers) verbal cues;nonverbal cues (demo/gesture);contact guard;minimum assist (75% patient effort)  -     Row Name 08/18/22 1339          Sit-Stand Transfer    Assistive Device (Sit-Stand Transfers) walker, front-wheeled  -     Row Name 08/18/22 1339          Activities of Daily Living    BADL Assessment/Intervention bathing;upper body dressing;lower body dressing;grooming;feeding;toileting  -[facility] Name 08/18/22 1339          Bathing Assessment/Intervention    [place] Level (Bathing) bathing skills;minimum assist (75% patient effort)  -[facility] Name 08/18/22 1339          Upper Body Dressing Assessment/Training    [place] Level (Upper Body Dressing) upper body dressing skills;set up  -[facility] Name 08/18/22 1339          Lower Body Dressing Assessment/Training    [place] Level (Lower Body Dressing) lower body dressing skills;minimum assist (75% patient effort)  -[facility] Name 08/18/22 1339          Grooming Assessment/Training    [place] Level (Grooming) grooming skills;set up  -[facility] Name 08/18/22 1339          Self-Feeding Assessment/Training    [place] Level (Feeding) feeding skills;independent  -[facility] Name 08/18/22 1339          Toileting Assessment/Training    [place] Level (Toileting) toileting skills;minimum assist (75% patient effort)  -           User Key  (r) = Recorded By, (t) = Taken By, (c) = Cosigned By    Initials Name Provider Type    Stu Riddle OT Occupational Therapist               Obj/Interventions     [facility] Name 08/18/22 1340          Sensory Assessment (Somatosensory)    Sensory Assessment (Somatosensory) sensation intact  -KN     Row Name 08/18/22 1340          Vision Assessment/Intervention    Visual Impairment/Limitations WFL  -[facility] Name 08/18/22 1340          Range of Motion Comprehensive    General Range of Motion no range of motion deficits  identified  -KN     Row Name 08/18/22 1340          Strength Comprehensive (MMT)    General Manual Muscle Testing (MMT) Assessment no strength deficits identified  -KN     Row Name 08/18/22 1340          Motor Skills    Motor Skills coordination;functional endurance  -KN     Coordination WFL  -KN     Functional Endurance F+  -KN     Row Name 08/18/22 1340          Balance    Balance Assessment sitting static balance;sitting dynamic balance  -KN     Static Sitting Balance supervision  -KN     Dynamic Sitting Balance supervision  -KN     Position, Sitting Balance sitting edge of bed  -KN     Static Standing Balance contact guard;minimal assist  -KN     Position/Device Used, Standing Balance supported;walker, rolling  -KN           User Key  (r) = Recorded By, (t) = Taken By, (c) = Cosigned By    Initials Name Provider Type    Sut Riddle, OT Occupational Therapist               Goals/Plan     [facility] Name 08/18/22 1349          Bed Mobility Goal 1 (OT)    Activity/Assistive Device (Bed Mobility Goal 1, OT) bed mobility activities, all  -KN     [place] Level/Cues Needed (Bed Mobility Goal 1, OT) modified independence  -KN     Time Frame (Bed Mobility Goal 1, OT) short term goal (STG);2 weeks  -     Row Name 08/18/22 1349          Transfer Goal 1 (OT)    Activity/Assistive Device (Transfer Goal 1, OT) transfers, all  -KN     [place] Level/Cues Needed (Transfer Goal 1, OT) modified independence  -KN     Time Frame (Transfer Goal 1, OT) short term goal (STG);2 weeks  -[facility] Name 08/18/22 1349          Dressing Goal 1 (OT)    Activity/Device (Dressing Goal 1, OT) dressing skills, all  -KN     [place]/Cues Needed (Dressing Goal 1, OT) modified independence  -KN     Time Frame (Dressing Goal 1, OT) short term goal (STG);2 weeks  -     Row Name 08/18/22 1349          Therapy Assessment/Plan (OT)    Planned Therapy Interventions (OT) BADL retraining;activity tolerance training;functional balance  retraining;occupation/activity based interventions;patient/caregiver education/training;transfer/mobility retraining;strengthening exercise  -KN           User Key  (r) = Recorded By, (t) = Taken By, (c) = Cosigned By    Initials Name Provider Type    Stu Riddle, OT Occupational Therapist               Clinical Impression     Row Name 08/18/22 1341          Pain Assessment    Pretreatment Pain Rating 0/10 - no pain  -KN     Posttreatment Pain Rating 0/10 - no pain  -KN     Row Name 08/18/22 1341          Plan of Care Review    Plan of Care Reviewed With patient  -KN     Outcome Evaluation Pt is a 57 year old male who wad admitted to [facility] on 8/17 for ICH. Pt has a past medical history of a stroke about 6 months ago, HBP, and is currently still a smoker. Pt reports that he lives alone and is ind. at baseline. Pt son comes over to help when he needs it.  -     Row Name 08/18/22 1341          Therapy Assessment/Plan (OT)    Rehab Potential (OT) good, to achieve stated therapy goals  -     Criteria for Skilled Therapeutic Interventions Met (OT) yes;meets criteria;skilled treatment is necessary  -KN     Therapy Frequency (OT) 3 times/wk  -     Row Name 08/18/22 1341          Therapy Plan Review/Discharge Plan (OT)    Anticipated Discharge Disposition (OT) home with home health;home with assist  -     Row Name 08/18/22 1341          Vital Signs    O2 Delivery Pre Treatment room air  -KN     O2 Delivery Intra Treatment room air  -KN     O2 Delivery Post Treatment room air  -KN     Pre Patient Position Supine  -KN     Intra Patient Position Standing  -KN     Post Patient Position Supine  -KN     Row Name 08/18/22 1341          Positioning and Restraints    Pre-Treatment Position in bed  -KN     Post Treatment Position chair  -KN     In Chair reclined;notified nsg;with family/caregiver;call light within reach;encouraged to call for assist  -KN           User Key  (r) = Recorded By, (t) = Taken By, (c) = Cosigned  By    Initials Name Provider Type    Stu Riddle OT Occupational Therapist               Outcome Measures     Row Name 08/18/22 1350          How much help from another is currently needed...    Putting on and taking off regular lower body clothing? 3  -KN     Bathing (including washing, rinsing, and drying) 3  -KN     Toileting (which includes using toilet bed pan or urinal) 3  -KN     Putting on and taking off regular upper body clothing 4  -KN     Taking care of personal grooming (such as brushing teeth) 4  -KN     Eating meals 4  -KN     AM-PAC 6 Clicks Score (OT) 21  -KN     Row Name 08/18/22 1331          How much help from another person do you currently need...    Turning from your back to your side while in flat bed without using bedrails? 4  -MS     Moving from lying on back to sitting on the side of a flat bed without bedrails? 3  -MS     Moving to and from a bed to a chair (including a wheelchair)? 3  -MS     Standing up from a chair using your arms (e.g., wheelchair, bedside chair)? 3  -MS     Climbing 3-5 steps with a railing? 1  -MS     To walk in hospital room? 3  -MS     AM-PAC 6 Clicks Score (PT) 17  -MS     Highest level of mobility 5 --> Static standing  -MS     Row Name 08/18/22 1350          Modified Chaves Scale    Modified Chaves Scale 4 - Moderately severe disability.  Unable to walk without assistance, and unable to attend to own bodily needs without assistance.  -KN     Row Name 08/18/22 1350 08/18/22 1331       Functional Assessment    Outcome Measure Options AM-PAC 6 Clicks Daily Activity (OT);Modified Chaves  -KN AM-PAC 6 Clicks Basic Mobility (PT)  -MS          User Key  (r) = Recorded By, (t) = Taken By, (c) = Cosigned By    Initials Name Provider Type    MS Olya Cosby, PT Physical Therapist    Stu Riddle OT Occupational Therapist                Occupational Therapy Education                 Title: PT OT SLP Therapies (Done)     Topic: Occupational Therapy (Done)      Point: ADL training (Done)     Description:   Instruct learner(s) on proper safety adaptation and remediation techniques during self care or transfers.   Instruct in proper use of assistive devices.              Learning Progress Summary           Patient Acceptance, E, VU by CELESTE at 8/18/2022 1350                   Point: Home exercise program (Done)     Description:   Instruct learner(s) on appropriate technique for monitoring, assisting and/or progressing therapeutic exercises/activities.              Learning Progress Summary           Patient Acceptance, E, VU by CELESTE at 8/18/2022 1350                   Point: Precautions (Done)     Description:   Instruct learner(s) on prescribed precautions during self-care and functional transfers.              Learning Progress Summary           Patient Acceptance, E, VU by CELESTE at 8/18/2022 1350                   Point: Body mechanics (Done)     Description:   Instruct learner(s) on proper positioning and spine alignment during self-care, functional mobility activities and/or exercises.              Learning Progress Summary           Patient Acceptance, E, VU by CELESTE at 8/18/2022 1350                               User Key     Initials Effective Dates Name Provider Type Discipline    CELESTE 08/02/22 -  Stu Nino OT Occupational Therapist OT              OT Recommendation and Plan  Planned Therapy Interventions (OT): BADL retraining, activity tolerance training, functional balance retraining, occupation/activity based interventions, patient/caregiver education/training, transfer/mobility retraining, strengthening exercise  Therapy Frequency (OT): 3 times/wk  Plan of Care Review  Plan of Care Reviewed With: patient  Outcome Evaluation: Pt is a 57 year old male who wad admitted to [facility] on 8/17 for ICH. Pt has a past medical history of a stroke about 6 months ago, HBP, and is currently still a smoker. Pt reports that he lives alone and is ind. at baseline. Pt son comes over to help  when he needs it.     Time Calculation:    Time Calculation- OT     Row Name 08/18/22 1350             Time Calculation- OT    OT Start Time 0938  -KN      OT Stop Time 0954  -KN      OT Time Calculation (min) 16 min  -KN      OT Received On 08/18/22  -KN      OT - Next Appointment 08/22/22  -KN      OT Goal Re-Cert Due Date 09/01/22  -KN              Timed Charges    44898 - OT Therapeutic Activity Minutes 8  -KN              Untimed Charges    OT Eval/Re-eval Minutes 8  -KN              Total Minutes    Timed Charges Total Minutes 8  -KN      Untimed Charges Total Minutes 8  -KN       Total Minutes 16  -KN            User Key  (r) = Recorded By, (t) = Taken By, (c) = Cosigned By    Initials Name Provider Type    Stu Riddle OT Occupational Therapist              Therapy Charges for Today     Code Description Service Date Service Provider Modifiers Qty    32520386385 HC OT THERAPEUTIC ACT EA 15 MIN 8/18/2022 Stu Nino OT GO 1    75589116970 HC OT EVAL LOW COMPLEXITY 2 8/18/2022 Stu Nino OT GO 1               Stu Nino OT  8/18/2022

## 2022-08-18 NOTE — PLAN OF CARE
Goal Outcome Evaluation:  Plan of Care Reviewed With: patient           Outcome Evaluation: Pt is a 57 year old male who wad admitted to [facility] on 8/17 for ICH. Pt has a past medical history of a stroke about 6 months ago, HBP, and is currently still a smoker. Pt reports that he lives alone and is ind. at baseline. Pt son comes over to help when he needs it.

## 2022-08-19 ENCOUNTER — APPOINTMENT (OUTPATIENT)
Dept: CARDIOLOGY | Facility: HOSPITAL | Age: 57
End: 2022-08-19

## 2022-08-19 LAB
ANION GAP SERPL CALCULATED.3IONS-SCNC: 10.1 MMOL/L (ref 5–15)
AORTIC DIMENSIONLESS INDEX: 0.8 (DI)
ASCENDING AORTA: 3.4 CM
BH CV ECHO MEAS - ACS: 2.46 CM
BH CV ECHO MEAS - AO MAX PG: 9.3 MMHG
BH CV ECHO MEAS - AO MEAN PG: 5 MMHG
BH CV ECHO MEAS - AO ROOT DIAM: 3 CM
BH CV ECHO MEAS - AO V2 MAX: 152.7 CM/SEC
BH CV ECHO MEAS - AO V2 VTI: 27.6 CM
BH CV ECHO MEAS - AVA(I,D): 2.7 CM2
BH CV ECHO MEAS - EDV(CUBED): 72 ML
BH CV ECHO MEAS - EDV(MOD-SP2): 118 ML
BH CV ECHO MEAS - EDV(MOD-SP4): 126 ML
BH CV ECHO MEAS - EF(MOD-BP): 70.3 %
BH CV ECHO MEAS - EF(MOD-SP2): 66.9 %
BH CV ECHO MEAS - EF(MOD-SP4): 72.2 %
BH CV ECHO MEAS - ESV(CUBED): 20.7 ML
BH CV ECHO MEAS - ESV(MOD-SP2): 39 ML
BH CV ECHO MEAS - ESV(MOD-SP4): 35 ML
BH CV ECHO MEAS - FS: 34 %
BH CV ECHO MEAS - IVS/LVPW: 0.91 CM
BH CV ECHO MEAS - IVSD: 0.97 CM
BH CV ECHO MEAS - LAT PEAK E' VEL: 10.6 CM/SEC
BH CV ECHO MEAS - LV MASS(C)D: 138.5 GRAMS
BH CV ECHO MEAS - LV MAX PG: 5.6 MMHG
BH CV ECHO MEAS - LV MEAN PG: 3 MMHG
BH CV ECHO MEAS - LV V1 MAX: 118.7 CM/SEC
BH CV ECHO MEAS - LV V1 VTI: 22.9 CM
BH CV ECHO MEAS - LVIDD: 4.2 CM
BH CV ECHO MEAS - LVIDS: 2.7 CM
BH CV ECHO MEAS - LVOT AREA: 3.3 CM2
BH CV ECHO MEAS - LVOT DIAM: 2.05 CM
BH CV ECHO MEAS - LVPWD: 1.07 CM
BH CV ECHO MEAS - MED PEAK E' VEL: 10.1 CM/SEC
BH CV ECHO MEAS - MV A DUR: 0.17 SEC
BH CV ECHO MEAS - MV A MAX VEL: 67.3 CM/SEC
BH CV ECHO MEAS - MV DEC SLOPE: 298.5 CM/SEC2
BH CV ECHO MEAS - MV DEC TIME: 180 MSEC
BH CV ECHO MEAS - MV E MAX VEL: 64.7 CM/SEC
BH CV ECHO MEAS - MV E/A: 0.96
BH CV ECHO MEAS - MV MAX PG: 2.6 MMHG
BH CV ECHO MEAS - MV MEAN PG: 1.07 MMHG
BH CV ECHO MEAS - MV P1/2T: 83.6 MSEC
BH CV ECHO MEAS - MV V2 VTI: 21.7 CM
BH CV ECHO MEAS - MVA(P1/2T): 2.6 CM2
BH CV ECHO MEAS - MVA(VTI): 3.5 CM2
BH CV ECHO MEAS - PA ACC TIME: 0.12 SEC
BH CV ECHO MEAS - PA PR(ACCEL): 26.7 MMHG
BH CV ECHO MEAS - PA V2 MAX: 107 CM/SEC
BH CV ECHO MEAS - PULM A REVS DUR: 0.12 SEC
BH CV ECHO MEAS - PULM A REVS VEL: 20.6 CM/SEC
BH CV ECHO MEAS - PULM DIAS VEL: 42.1 CM/SEC
BH CV ECHO MEAS - PULM S/D: 1.3
BH CV ECHO MEAS - PULM SYS VEL: 54.7 CM/SEC
BH CV ECHO MEAS - QP/QS: 0.54
BH CV ECHO MEAS - RAP SYSTOLE: 8 MMHG
BH CV ECHO MEAS - RV MAX PG: 2.8 MMHG
BH CV ECHO MEAS - RV V1 MAX: 84 CM/SEC
BH CV ECHO MEAS - RV V1 VTI: 14.4 CM
BH CV ECHO MEAS - RVOT DIAM: 1.91 CM
BH CV ECHO MEAS - RVSP: 29.1 MMHG
BH CV ECHO MEAS - SV(LVOT): 75.6 ML
BH CV ECHO MEAS - SV(MOD-SP2): 79 ML
BH CV ECHO MEAS - SV(MOD-SP4): 91 ML
BH CV ECHO MEAS - SV(RVOT): 41 ML
BH CV ECHO MEAS - TAPSE (>1.6): 2.8 CM
BH CV ECHO MEAS - TR MAX PG: 21.1 MMHG
BH CV ECHO MEAS - TR MAX VEL: 229.8 CM/SEC
BH CV ECHO MEASUREMENTS AVERAGE E/E' RATIO: 6.25
BH CV VAS BP RIGHT ARM: NORMAL MMHG
BH CV XLRA - RV BASE: 3.8 CM
BH CV XLRA - RV LENGTH: 6.6 CM
BH CV XLRA - RV MID: 3.3 CM
BH CV XLRA - TDI S': 15.1 CM/SEC
BUN SERPL-MCNC: 18 MG/DL (ref 6–20)
BUN/CREAT SERPL: 25.7 (ref 7–25)
CALCIUM SPEC-SCNC: 8.6 MG/DL (ref 8.6–10.5)
CHLORIDE SERPL-SCNC: 108 MMOL/L (ref 98–107)
CO2 SERPL-SCNC: 23.9 MMOL/L (ref 22–29)
CREAT SERPL-MCNC: 0.7 MG/DL (ref 0.76–1.27)
DEPRECATED RDW RBC AUTO: 44.7 FL (ref 37–54)
EGFRCR SERPLBLD CKD-EPI 2021: 106.1 ML/MIN/1.73
ERYTHROCYTE [DISTWIDTH] IN BLOOD BY AUTOMATED COUNT: 13.1 % (ref 12.3–15.4)
GLUCOSE BLDC GLUCOMTR-MCNC: 120 MG/DL (ref 70–130)
GLUCOSE BLDC GLUCOMTR-MCNC: 81 MG/DL (ref 70–130)
GLUCOSE BLDC GLUCOMTR-MCNC: 83 MG/DL (ref 70–130)
GLUCOSE SERPL-MCNC: 96 MG/DL (ref 65–99)
HCT VFR BLD AUTO: 43.1 % (ref 37.5–51)
HGB BLD-MCNC: 14.7 G/DL (ref 13–17.7)
LEFT ATRIUM VOLUME INDEX: 27.6 ML/M2
LV EF 2D ECHO EST: 70 %
MAXIMAL PREDICTED HEART RATE: 161 BPM
MCH RBC QN AUTO: 31.5 PG (ref 26.6–33)
MCHC RBC AUTO-ENTMCNC: 34.1 G/DL (ref 31.5–35.7)
MCV RBC AUTO: 92.3 FL (ref 79–97)
PLATELET # BLD AUTO: 193 10*3/MM3 (ref 140–450)
PMV BLD AUTO: 10.2 FL (ref 6–12)
POTASSIUM SERPL-SCNC: 3.7 MMOL/L (ref 3.5–5.2)
RBC # BLD AUTO: 4.67 10*6/MM3 (ref 4.14–5.8)
SINUS: 3 CM
SODIUM SERPL-SCNC: 142 MMOL/L (ref 136–145)
STJ: 3.2 CM
STRESS TARGET HR: 137 BPM
WBC NRBC COR # BLD: 7.65 10*3/MM3 (ref 3.4–10.8)

## 2022-08-19 PROCEDURE — 99232 SBSQ HOSP IP/OBS MODERATE 35: CPT | Performed by: PSYCHIATRY & NEUROLOGY

## 2022-08-19 PROCEDURE — 97110 THERAPEUTIC EXERCISES: CPT

## 2022-08-19 PROCEDURE — 93306 TTE W/DOPPLER COMPLETE: CPT | Performed by: INTERNAL MEDICINE

## 2022-08-19 PROCEDURE — 85027 COMPLETE CBC AUTOMATED: CPT | Performed by: INTERNAL MEDICINE

## 2022-08-19 PROCEDURE — 80048 BASIC METABOLIC PNL TOTAL CA: CPT | Performed by: INTERNAL MEDICINE

## 2022-08-19 PROCEDURE — 82962 GLUCOSE BLOOD TEST: CPT

## 2022-08-19 PROCEDURE — 93306 TTE W/DOPPLER COMPLETE: CPT

## 2022-08-19 RX ADMIN — ASPIRIN 81 MG: 81 TABLET, COATED ORAL at 08:06

## 2022-08-19 RX ADMIN — HYDROCODONE BITARTRATE AND ACETAMINOPHEN 1 TABLET: 5; 325 TABLET ORAL at 03:36

## 2022-08-19 RX ADMIN — HYDROCODONE BITARTRATE AND ACETAMINOPHEN 1 TABLET: 5; 325 TABLET ORAL at 22:04

## 2022-08-19 RX ADMIN — AMLODIPINE BESYLATE 10 MG: 10 TABLET ORAL at 08:06

## 2022-08-19 RX ADMIN — HYDROCODONE BITARTRATE AND ACETAMINOPHEN 1 TABLET: 5; 325 TABLET ORAL at 10:18

## 2022-08-19 RX ADMIN — Medication 10 ML: at 08:07

## 2022-08-19 RX ADMIN — Medication 10 ML: at 20:33

## 2022-08-19 RX ADMIN — LISINOPRIL 10 MG: 10 TABLET ORAL at 08:06

## 2022-08-19 RX ADMIN — HYDROCODONE BITARTRATE AND ACETAMINOPHEN 1 TABLET: 5; 325 TABLET ORAL at 16:09

## 2022-08-19 RX ADMIN — ATORVASTATIN CALCIUM 40 MG: 20 TABLET, FILM COATED ORAL at 20:32

## 2022-08-19 NOTE — PROGRESS NOTES
"DOS: 2022  NAME: Gerry Campos   : 1965  PCP: Provider, No Known  No chief complaint on file.      Chief complaint: stroke, ICH  Subjective: patient new to me today. 57 yoM with poorly controlled HTN and tobacco abuse, p/w dizziness and ataxia, found to have severe WMD and left subcortical hypertensive ICH/lacunar stroke with ICH    Objective:  Vital signs: /77   Pulse 64   Temp 97.9 °F (36.6 °C) (Oral)   Resp 16   Ht 180.3 cm (71\")   Wt 86.2 kg (190 lb 0.6 oz)   SpO2 93%   BMI 26.52 kg/m²    Gen: NAD, vitals reviewed  MS: oriented x3, recent/remote memory intact, normal attention/concentration, language intact, no neglect.  CN: visual acuity grossly normal, PERRL, EOMI, mild left facial droop, moderate dysarthria  Motor: 4+/5 LUE/LE  Sensory: diminished to cold temp L UE/LE    ROS:  + weakness, numbness  No fevers, chills      Laboratory results:  Lab Results   Component Value Date    GLUCOSE 96 2022    CALCIUM 8.6 2022     2022    K 3.7 2022    CO2 23.9 2022     (H) 2022    BUN 18 2022    CREATININE 0.70 (L) 2022    BCR 25.7 (H) 2022    ANIONGAP 10.1 2022     Lab Results   Component Value Date    WBC 7.65 2022    HGB 14.7 2022    HCT 43.1 2022    MCV 92.3 2022     2022     Lab Results   Component Value Date    LDL 87 2022         Lab 22  0321   HEMOGLOBIN A1C 5.50        Review of labs: LDL 87, A1C  5.5%    Review and interpretation of imaging: I personally reviewed his brain MRI performed this admission which shows severe, advanced WMD and an acute left subcortical hemorrhage with associated lacunar stroke    Diagnoses:  Binswanger disease (vascular dementia)  Essential hypertension  Tobacco abuse  Left subcortical hypertensive hemorrhage  Lacunar stroke  Tobacco abuse    Comment: Neurologically stable. Needs aggressive risk factor control to prevent development of " vascular dementia given MRI findings    Plan:  1. ASA, statin. Add Plavix Monday 8/22 and continue DAPT at least 3 months, possibly long term  2. Tight BP control <130/80  3. Discussed smoking cessation with him    Ok to transfer to floor from a neurology standpoint    Discussed with Dr. Burton.

## 2022-08-19 NOTE — PROGRESS NOTES
[facility] INPATIENT PROGRESS NOTE         [facility] INTENSIVE CARE    2022      PATIENT IDENTIFICATION:  Name: Gerry Campos ADMIT: 2022   : 1965  PCP: Provider, No Known    MRN: [id number] LOS: 2 days   AGE/SEX: 57 y.o. male  ROOM: [facility]                     LOS 2    Reason for visit: Intracranial hemorrhage      SUBJECTIVE:      Still complains of significant dizziness with movement.  Has been cleared for aspirin and plan for Plavix to start on the .  Can move out of intensive care.    Objective   OBJECTIVE:    Vital Sign Min/Max for last 24 hours  Temp  Min: 97.7 °F (36.5 °C)  Max: 98.6 °F (37 °C)   BP  Min: 116/58  Max: 178/92   Pulse  Min: 60  Max: 90   Resp  Min: 14  Max: 16   SpO2  Min: 92 %  Max: 97 %   No data recorded   Weight  Min: 86.2 kg (190 lb 0.6 oz)  Max: 86.2 kg (190 lb 0.6 oz)                         Body mass index is 26.52 kg/m².    Intake/Output Summary (Last 24 hours) at 2022 0949  Last data filed at 2022 0800  Gross per 24 hour   Intake 586.3 ml   Output 805 ml   Net -218.7 ml         Exam:  GEN:  No distress, appears stated age  EYES:   PERRL, anicteric sclerae  ENT:    External ears/nose normal, OP clear  NECK:  No adenopathy, midline trachea  LUNGS: Normal chest on inspection, palpation and auscultation  CV:  Normal S1S2, without murmur  ABD:  Nontender, nondistended, no hepatosplenomegaly, +BS  EXT:  No edema.  No cyanosis or clubbing.  No mottling and normal cap refill.  Left leg weakness    Assessment     Scheduled meds:  amLODIPine, 10 mg, Oral, Q24H  aspirin, 81 mg, Oral, Daily  atorvastatin, 40 mg, Oral, Nightly  insulin regular, 0-14 Units, Subcutaneous, Q6H  lisinopril, 10 mg, Oral, Q24H  sodium chloride, 10 mL, Intravenous, Q12H      IV meds:                      niCARdipine, 5-15 mg/hr, Last Rate: Stopped (22 0336)      Data Review:  Results from last 7 days   Lab Units 22  4044 22  0431 22  0321   SODIUM  mmol/L 142 140 143   POTASSIUM mmol/L 3.7 3.6 3.0*   CHLORIDE mmol/L 108* 107 105   CO2 mmol/L 23.9 23.0 27.0   BUN mg/dL 18 15 14   CREATININE mg/dL 0.70* 0.70* 0.80   GLUCOSE mg/dL 96 100* 99   CALCIUM mg/dL 8.6 8.9 8.9         Estimated Creatinine Clearance: 138.5 mL/min (A) (by C-G formula based on SCr of 0.7 mg/dL (L)).  Results from last 7 days   Lab Units 08/19/22  0343 08/18/22  0431 08/17/22  0321   WBC 10*3/mm3 7.65 6.47 5.95   HEMOGLOBIN g/dL 14.7 14.4 14.1   PLATELETS 10*3/mm3 193 207 186     Results from last 7 days   Lab Units 08/17/22  0321   INR  1.11*     Results from last 7 days   Lab Units 08/17/22  0321   ALT (SGPT) U/L 17   AST (SGOT) U/L 17                 Hemoglobin A1C   Date/Time Value Ref Range Status   08/17/2022 0321 5.50 4.80 - 5.60 % Final     Glucose   Date/Time Value Ref Range Status   08/18/2022 0004 100 70 - 130 mg/dL Final     Comment:     Meter: [id number] : LARRY Stratton RN   08/17/2022 1128 89 70 - 130 mg/dL Final     Comment:     Meter: [id number] : [id number] Immanuel ARDON     CT head 8/18 reviewed: Shows similar hemorrhage in left periventricular white matter.  No herniation.        Microbiology reviewed                Active Hospital Problems    Diagnosis  POA   • ICH (intracerebral hemorrhage) (HCC) [I61.9]  Yes   • Hypertension [I10]  Yes   • History of Stroke (HCC) [I63.9]  Yes      Resolved Hospital Problems   No resolved problems to display.         ASSESSMENT:    Intracranial hemorrhage left insular region  Dizziness, ataxia and headaches  Hypertension, uncontrolled  Hyperlipidemia  Smoker          PLAN:    Control blood pressure.  Added additional p.o. blood pressure medications and weaned off Cardene.  Control glucose.  MRI and repeat CT head reviewed.  Aspirin and start Plavix as well on the 22nd per neurosurgery and neurology recommendations.  Mechanical DVT prophylaxis.  Discussed with neurosurgery and neurology  services.  PT/OT    Discussed with multidisciplinary ICU team on rounds this morning.    Transfer out of ICU.    Dylan Burton MD  Pulmonary and Critical Care Medicine  [facility], [facility]  8/19/2022    09:49 EDT

## 2022-08-19 NOTE — PLAN OF CARE
Goal Outcome Evaluation:  Plan of Care Reviewed With: patient        Progress: improving  Outcome Evaluation: Pt seen for PT this am. He is doing well today. No complaints of pain at this time, but states he generally has a harder time as the day goes on and increased difficulty w fine motor skills. Pt showing improvement w functional mobility at this time. He was able to increase ambulation distance, ambulating approx 150 ft w Rwx and CGA. No LOB noted. Pt plans home at [place]. Recommend possible outpt PT/OT for strengthening and fine motor skills. DIscussed w RN. Will continue to progress activity as tolerated.

## 2022-08-19 NOTE — THERAPY TREATMENT NOTE
Patient Name: Gerry Campos  : 1965    MRN: [id number]                              Today's Date: 2022       Admit Date: 2022    Visit Dx: No diagnosis found.  Patient Active Problem List   Diagnosis   • ICH (intracerebral hemorrhage) (HCC)   • Hypertension   • History of Stroke (HCC)     Past Medical History:   Diagnosis Date   • Hyperlipidemia    • Hypertension    • Sleep apnea    • Stroke (HCC)      Past Surgical History:   Procedure Laterality Date   • APPENDECTOMY     • BACK SURGERY        General Information     Row Name 22 1007          Physical Therapy Time and Intention    Document Type therapy note (daily note)  -EJ     Mode of Treatment physical therapy  -EJ           User Key  (r) = Recorded By, (t) = Taken By, (c) = Cosigned By    Initials Name Provider Type    Tata Lakhani, PT Physical Therapist               Mobility     Row Name 22 1008          Bed Mobility    Supine-Sit Hill (Bed Mobility) supervision;verbal cues  -EJ     Sit-Supine Hill (Bed Mobility) supervision;verbal cues  -EJ     Assistive Device (Bed Mobility) bed rails;head of bed elevated  -EJ     Row Name 22 1008          Sit-Stand Transfer    Sit-Stand Hill (Transfers) verbal cues;contact guard  -EJ     Assistive Device (Sit-Stand Transfers) walker, front-wheeled  -EJ     Row Name 22 1008          Gait/Stairs (Locomotion)    Hill Level (Gait) contact guard  -EJ     Assistive Device (Gait) walker, front-wheeled  -EJ     Distance in Feet (Gait) 150  -EJ     Deviations/Abnormal Patterns (Gait) sarah decreased;stride length decreased  -EJ     Comment, (Gait/Stairs) no unsteadiness noted today w use of Rwx  -EJ           User Key  (r) = Recorded By, (t) = Taken By, (c) = Cosigned By    Initials Name Provider Type    Tata Lakhani, PT Physical Therapist               Obj/Interventions    No documentation.                Goals/Plan    No  documentation.                Clinical Impression     Row Name 08/19/22 1010          Pain    Pretreatment Pain Rating 0/10 - no pain  -EJ     Row Name 08/19/22 1010          Plan of Care Review    Plan of Care Reviewed With patient  -EJ     Progress improving  -EJ     Outcome Evaluation Pt seen for PT this am. He is doing well today. No complaints of pain at this time, but states he generally has a harder time as the day goes on and increased difficulty w fine motor skills. Pt showing improvement w functional mobility at this time. He was able to increase ambulation distance, ambulating approx 150 ft w Rwx and CGA. No LOB noted. Pt plans home at [place]. Recommend possible outpt PT/OT for strengthening and fine motor skills. DIscussed w RN. Will continue to progress activity as tolerated.  -EJ     Row Name 08/19/22 1010          Positioning and Restraints    Pre-Treatment Position in bed  -EJ     Post Treatment Position bed  -EJ     In Bed notified nsg;supine;call light within reach;encouraged to call for assist;exit alarm on  -EJ           User Key  (r) = Recorded By, (t) = Taken By, (c) = Cosigned By    Initials Name Provider Type    Tata Lakhani, PT Physical Therapist               Outcome Measures     Row Name 08/19/22 1012 08/19/22 0800       How much help from another person do you currently need...    Turning from your back to your side while in flat bed without using bedrails? 4  -EJ 4  -JM    Moving from lying on back to sitting on the side of a flat bed without bedrails? 3  -EJ 3  -JM    Moving to and from a bed to a chair (including a wheelchair)? 3  -EJ 3  -JM    Standing up from a chair using your arms (e.g., wheelchair, bedside chair)? 3  -EJ 3  -JM    Climbing 3-5 steps with a railing? 3  -EJ 1  -JM    To walk in hospital room? 3  -EJ 3  -JM    AM-PAC 6 Clicks Score (PT) 19  -EJ 17  -JM    Highest level of mobility 6 --> Walked 10 steps or more  -EJ 5 --> Static standing  -JM          User Key   (r) = Recorded By, (t) = Taken By, (c) = Cosigned By    Initials Name Provider Type    EJ Tata Garcia, PT Physical Therapist    Katharina Cary RN Registered Nurse                             Physical Therapy Education                 Title: PT OT SLP Therapies (In Progress)     Topic: Physical Therapy (In Progress)     Point: Mobility training (Done)     Learning Progress Summary           Patient Acceptance, E,TB,D, VU by  at 8/19/2022 1013                   Point: Home exercise program (Not Started)     Learner Progress:  Not documented in this visit.          Point: Body mechanics (Not Started)     Learner Progress:  Not documented in this visit.          Point: Precautions (Not Started)     Learner Progress:  Not documented in this visit.                      User Key     Initials Effective Dates Name Provider Type [facility] 06/16/21 -  Tata Garcia, PT Physical Therapist PT              PT Recommendation and Plan     Plan of Care Reviewed With: patient  Progress: improving  Outcome Evaluation: Pt seen for PT this am. He is doing well today. No complaints of pain at this time, but states he generally has a harder time as the day goes on and increased difficulty w fine motor skills. Pt showing improvement w functional mobility at this time. He was able to increase ambulation distance, ambulating approx 150 ft w Rwx and CGA. No LOB noted. Pt plans home at [place]. Recommend possible outpt PT/OT for strengthening and fine motor skills. DIscussed w RN. Will continue to progress activity as tolerated.     Time Calculation:    PT Charges     Row Name 08/19/22 1013             Time Calculation    Start Time 0939  -EJ      Stop Time 0953  -EJ      Time Calculation (min) 14 min  -EJ      PT Received On 08/19/22  -EJ      PT - Next Appointment 08/20/22  -EJ            User Key  (r) = Recorded By, (t) = Taken By, (c) = Cosigned By    Initials Name Provider Type    EJ Tata Garcia, PT Physical  Therapist              Therapy Charges for Today     Code Description Service Date Service Provider Modifiers Qty    [id number]  PT THER PROC EA 15 MIN 8/19/2022 Tata Garcia, PT GP 1          PT G-Codes  Outcome Measure Options: AM-PAC 6 Clicks Daily Activity (OT), Modified Savage  AM-PAC 6 Clicks Score (PT): 19  AM-PAC 6 Clicks Score (OT): 21  Modified Savage Scale: 4 - Moderately severe disability.  Unable to walk without assistance, and unable to attend to own bodily needs without assistance.    Tata Garcia, PT  8/19/2022

## 2022-08-19 NOTE — PLAN OF CARE
Goal Outcome Evaluation:  Plan of Care Reviewed With: patient        Progress: improving  Patient arrived to unit in wheelchair with his belongings. He is alert and oriented. No distress noted. He reports frontal headache. VSS. He denies numbness and tingling to all extremities. Strong ROM noted. NIH zero. Patient oriented to room and unit. Call light in reach. Encouraged to call for assistance if needed.

## 2022-08-20 ENCOUNTER — READMISSION MANAGEMENT (OUTPATIENT)
Dept: CALL CENTER | Facility: HOSPITAL | Age: 57
End: 2022-08-20

## 2022-08-20 VITALS
HEIGHT: 71 IN | OXYGEN SATURATION: 93 % | TEMPERATURE: 98.3 F | RESPIRATION RATE: 17 BRPM | WEIGHT: 190 LBS | BODY MASS INDEX: 26.6 KG/M2 | DIASTOLIC BLOOD PRESSURE: 75 MMHG | HEART RATE: 62 BPM | SYSTOLIC BLOOD PRESSURE: 128 MMHG

## 2022-08-20 LAB
ANION GAP SERPL CALCULATED.3IONS-SCNC: 13 MMOL/L (ref 5–15)
BUN SERPL-MCNC: 19 MG/DL (ref 6–20)
BUN/CREAT SERPL: 22.9 (ref 7–25)
CALCIUM SPEC-SCNC: 8.9 MG/DL (ref 8.6–10.5)
CHLORIDE SERPL-SCNC: 103 MMOL/L (ref 98–107)
CO2 SERPL-SCNC: 25 MMOL/L (ref 22–29)
CREAT SERPL-MCNC: 0.83 MG/DL (ref 0.76–1.27)
DEPRECATED RDW RBC AUTO: 42.8 FL (ref 37–54)
EGFRCR SERPLBLD CKD-EPI 2021: 100.8 ML/MIN/1.73
ERYTHROCYTE [DISTWIDTH] IN BLOOD BY AUTOMATED COUNT: 12.9 % (ref 12.3–15.4)
GLUCOSE BLDC GLUCOMTR-MCNC: 144 MG/DL (ref 70–130)
GLUCOSE BLDC GLUCOMTR-MCNC: 85 MG/DL (ref 70–130)
GLUCOSE SERPL-MCNC: 123 MG/DL (ref 65–99)
HCT VFR BLD AUTO: 43 % (ref 37.5–51)
HGB BLD-MCNC: 15 G/DL (ref 13–17.7)
MCH RBC QN AUTO: 31.6 PG (ref 26.6–33)
MCHC RBC AUTO-ENTMCNC: 34.9 G/DL (ref 31.5–35.7)
MCV RBC AUTO: 90.7 FL (ref 79–97)
PLATELET # BLD AUTO: 199 10*3/MM3 (ref 140–450)
PMV BLD AUTO: 10.4 FL (ref 6–12)
POTASSIUM SERPL-SCNC: 3.8 MMOL/L (ref 3.5–5.2)
RBC # BLD AUTO: 4.74 10*6/MM3 (ref 4.14–5.8)
SODIUM SERPL-SCNC: 141 MMOL/L (ref 136–145)
WBC NRBC COR # BLD: 6.74 10*3/MM3 (ref 3.4–10.8)

## 2022-08-20 PROCEDURE — 85027 COMPLETE CBC AUTOMATED: CPT | Performed by: INTERNAL MEDICINE

## 2022-08-20 PROCEDURE — 99233 SBSQ HOSP IP/OBS HIGH 50: CPT | Performed by: NURSE PRACTITIONER

## 2022-08-20 PROCEDURE — 80048 BASIC METABOLIC PNL TOTAL CA: CPT | Performed by: INTERNAL MEDICINE

## 2022-08-20 PROCEDURE — 82962 GLUCOSE BLOOD TEST: CPT

## 2022-08-20 RX ORDER — ATORVASTATIN CALCIUM 40 MG/1
40 TABLET, FILM COATED ORAL NIGHTLY
Qty: 90 TABLET | Refills: 0 | Status: SHIPPED | OUTPATIENT
Start: 2022-08-20

## 2022-08-20 RX ORDER — CLOPIDOGREL BISULFATE 75 MG/1
75 TABLET ORAL DAILY
Qty: 30 TABLET | Refills: 0 | Status: SHIPPED | OUTPATIENT
Start: 2022-08-22

## 2022-08-20 RX ORDER — AMLODIPINE BESYLATE 10 MG/1
10 TABLET ORAL
Qty: 30 TABLET | Refills: 0 | Status: SHIPPED | OUTPATIENT
Start: 2022-08-21 | End: 2022-09-20

## 2022-08-20 RX ADMIN — AMLODIPINE BESYLATE 10 MG: 10 TABLET ORAL at 08:14

## 2022-08-20 RX ADMIN — HYDROCODONE BITARTRATE AND ACETAMINOPHEN 1 TABLET: 5; 325 TABLET ORAL at 06:32

## 2022-08-20 RX ADMIN — LISINOPRIL 10 MG: 10 TABLET ORAL at 08:14

## 2022-08-20 RX ADMIN — Medication 10 ML: at 08:14

## 2022-08-20 RX ADMIN — ASPIRIN 81 MG: 81 TABLET, COATED ORAL at 08:14

## 2022-08-20 NOTE — DISCHARGE SUMMARY
PHYSICIAN DISCHARGE SUMMARY                                                                        [facility]    Date of admit: 8/17/2022  Date of Discharge:  8/20/2022    PCP: Provider, No Known    Discharge Diagnosis:     ICH (intracerebral hemorrhage) (HCC)    Hypertension    History of Stroke (HCC)  Intracranial hemorrhage left insular region  Dizziness, ataxia and headaches  Hypertension, uncontrolled  Hyperlipidemia  Smoker    Secondary Diagnoses:  Past Medical History:   Diagnosis Date   • Hyperlipidemia    • Hypertension    • Sleep apnea    • Stroke (HCC)        Procedures Performed           Consults:       Hospital Course  Patient is a 57 y.o. male presented with      CC: Ataxia, loss of balance, right arm and left leg weakness     History of Present Illness:  56 yo male presents from [facility] in transfer for neurosurgical consultation. He complains of 3 weeks of progressive but intermittent weakness in his RUE and left leg, associated with imbalance and ataxia, but denies fall or head trauma. Has not had double vision nor seizures. Denies chest pain or SOA. Has had previous thalamic stroke. I discussed case over the phone with Dr Jag Ross  He presented there with /95.  Has also been having some headaches on and off for weeks now.  Outside records from [facility] reviewed.  The patient is supposed to be on statin, angiotensin receptor blockers and aspirin.        Patient admitted with left insular intracranial hemorrhage and uncontrolled hypertension.  Was seen by neurology and neurosurgery service.  Started on blood pressure medications with goal to keep systolic less than 130.  Has done well and was able to transfer out of ICU.  Resumed on his aspirin and plan to resume Plavix but neurosurgery would like to hold Plavix until the 22nd and then patient may resume.  Follow-up with neurology as an  outpatient.  Patient has been cleared for discharge.    Condition on Discharge:  Stable.      Vital Signs  Temp:  [97.8 °F (36.6 °C)-99 °F (37.2 °C)] 98.3 °F (36.8 °C)  Heart Rate:  [61-79] 62  Resp:  [17-18] 17  BP: (128-154)/(75-90) 128/75    Physical Exam:  General Appearance: no acute distress, appears comfortable  Heart: regular rate and rhythm  Lungs: clear to auscultation bilaterally, respirations unlabored  Abdomen: soft, nontender, nondistended, no guarding/rebound, nl BS  Extremeties: no edema, no cyanosis  Neurology: speech normal, mental status normal, moving all four extremities  Mental Status: alert, oriented        --  Consults:   IP CONSULT TO NEUROLOGY    Significant Discharge Diagnostics   Procedures Performed:         Pertinent Lab Results:  Results from last 7 days   Lab Units 08/19/22  0343 08/18/22  0431 08/17/22  0321   SODIUM mmol/L 142 140 143   POTASSIUM mmol/L 3.7 3.6 3.0*   CHLORIDE mmol/L 108* 107 105   CO2 mmol/L 23.9 23.0 27.0   BUN mg/dL 18 15 14   CREATININE mg/dL 0.70* 0.70* 0.80   GLUCOSE mg/dL 96 100* 99   CALCIUM mg/dL 8.6 8.9 8.9   AST (SGOT) U/L  --   --  17   ALT (SGPT) U/L  --   --  17         Results from last 7 days   Lab Units 08/20/22  0910 08/19/22  0343 08/18/22  0431 08/17/22  0321   WBC 10*3/mm3 6.74 7.65 6.47 5.95   HEMOGLOBIN g/dL 15.0 14.7 14.4 14.1   HEMATOCRIT % 43.0 43.1 42.5 39.2   PLATELETS 10*3/mm3 199 193 207 186   MCV fL 90.7 92.3 91.8 88.3   MCH pg 31.6 31.5 31.1 31.8   MCHC g/dL 34.9 34.1 33.9 36.0*   RDW % 12.9 13.1 13.1 12.9   RDW-SD fl 42.8 44.7 43.5 40.9   MPV fL 10.4 10.2 10.3 10.1     Results from last 7 days   Lab Units 08/17/22  0321   INR  1.11*     Results from last 7 days   Lab Units 08/17/22  0321   MAGNESIUM mg/dL 2.0     Results from last 7 days   Lab Units 08/17/22  0321   CHOLESTEROL mg/dL 140   TRIGLYCERIDES mg/dL 137   HDL CHOL mg/dL 28*         Results from last 7 days   Lab Units 08/17/22  0321   TSH uIU/mL 2.560                                        Imaging Results:  Imaging Results (All)     Procedure Component Value Units Date/Time    CT Head Without Contrast [[id number]] Collected: 08/18/22 0429     Updated: 08/18/22 0429    Narrative:        Patient: DANIEL PARKS  Time Out: 04:28  Exam(s): CT HEAD Without Contrast     EXAM:    CT Head Without Intravenous Contrast    CLINICAL HISTORY:     Reason for exam: f u ICH.    TECHNIQUE:    Axial computed tomography images of the head brain without intravenous   contrast.  CTDI is 54.8 mGy and DLP is 1023.8 mGy-cm.  This CT exam was   performed according to the principle of ALARA (As Low As Reasonably   Achievable) by using one or more of the following dose reduction   techniques: automated exposure control, adjustment of the mA and or kV   according to patient size, and or use of iterative reconstruction   technique.    COMPARISON:      8 17 2022    Findings:    See impression    IMPRESSION:           Similar hemorrhage in the left periventricular white matter.  No herniation.    Impression:          Electronically signed by Roxie Hamm MD on 08-18-22 at 0428    MRI Brain With & Without Contrast [[id number]] Collected: 08/18/22 0211     Updated: 08/18/22 0211    Narrative:        Patient: DANIEL PARKS  Time Out: 02:11  Exam(s): MRI HEAD W WO Contrast IV Amt: 18 ml multihance    EXAM:    MR Head Without and With Intravenous Contrast    CLINICAL HISTORY:     Reason for exam: L hemispheric hemorrhage, looking for any underlying   cause.    TECHNIQUE:    Magnetic resonance images of the head brain without and with   intravenous contrast in multiple planes.  Mild motion artifact. 18 ml   Multihance given IV.    COMPARISON:    Head CT done earlier the same day.    FINDINGS:    Brain: High T1 and low T2 signal in the left temporal lobe,   corresponding to hyperdense focus by CT, presumably reflects late   acute early subacute hemorrhage or hemorrhagic infarct, 8 x 8 x 6 mm,   with mild surrounding  edema.  No abnormal enhancement.  Probable small,   acute infarct left inferior cerebellum, with equivocal corresponding ADC.    Punctate, acute infarct right temporal lobe.  No mass-effect or acute   cortical infarct.  Moderate to severe, chronic white matter disease   supratentorially and within the mesfin.  No herniation.  Mild diffuse   atrophy.  No abnormal enhancement.    Ventricles:   No hydrocephalus or midline shift.    Bones joints:   No calvarial lesions.    Soft tissues: No scalp hematoma.    Sinuses: Clear.    Mastoid air cells:   No mastoid effusion.    IMPRESSION:       1.  Late acute subacute focus of hemorrhage left temporal lobe, with mild   surrounding edema.  No abnormal enhancement.  2.  Foci of acute infarct right temporal lobe and left cerebellum,   nonhemorrhagic.  3.  Moderate to severe, chronic white matter disease.  4.  Given multifocal involvement of acute infarct, consider neurology   consult and workup for possible cardiac or systemic etiology.      Impression:          Electronically signed by Yeimi Pagan M.D. on 08-18-22 at 0211    CT Angiogram Head [[id number]] Collected: 08/17/22 0552     Updated: 08/17/22 0632    Addenda:        ADDENDUM:  Patient: DANIEL PARKS  Time Out: 06:32  Exam(s): CTA HEAD      Added by Prabhakar Fabian MD on 8 17 2022 6:32 AM (-05:00)  Addendum:    Precontrast imaging was also performed as part of this examination.  It   shows curvilinear density in the posterior cortex of the left insular   cortex consistent with acute intraparenchymal hemorrhage.  The lack of   availability of prior imaging at the time of this interpretation limits   the ability to comment on presence or absence of progression in   hemorrhage.      Signed: 08/17/22 0632 by Prabhakar Fabian MD            ADDENDUM:  08 17 22 06:27 Call Doctor Regarding Stroke, called  Dr. Amina Beal on   08 17 06:26 (-04:00)      Signed: 08/17/22 0551 by Prabhakar Fabian MD    Narrative:       CR  Patient: DANIEL PARKS  Time Out: 05:51  Exam(s): CTA HEAD     EXAM:    CT Head With Intravenous Contrast    CLINICAL HISTORY:     Reason for exam: Stroke, hemorrhagic.    TECHNIQUE:  AI analysis of LVO was utilized    Axial computed tomographic images of the head with intravenous contrast.    CTDI is 54.8 mGy and DLP is 1073.1 mGy-cm.  This CT exam was performed   according to the principle of ALARA (As Low As Reasonably Achievable) by   using one or more of the following dose reduction techniques: automated   exposure control, adjustment of the mA and or kV according to patient   size, and or use of iterative reconstruction technique.    COMPARISON:    No relevant prior studies available.    FINDINGS:    Right internal carotid artery:  No acute findings.  Intracranial   segment is patent with no significant stenosis.  No aneurysm.    Right anterior cerebral artery:  Unremarkable.  No occlusion or   significant stenosis.  No aneurysm.    Right middle cerebral artery:  Unremarkable.  No occlusion or   significant stenosis.  No aneurysm.    Right posterior cerebral artery:  Unremarkable.  No occlusion or   significant stenosis.  No aneurysm.    Right vertebral artery:  Unremarkable as visualized.      Left internal carotid artery:  No acute findings.  Intracranial segment   is patent with no significant stenosis.  No aneurysm.    Left anterior cerebral artery:  Unremarkable.  No occlusion or   significant stenosis.  No aneurysm.    Left middle cerebral artery:  Unremarkable.  No occlusion or   significant stenosis.  No aneurysm.    Left posterior cerebral artery:  Unremarkable.  No occlusion or   significant stenosis.  No aneurysm.    Left vertebral artery:  Unremarkable as visualized.      Basilar artery:  Unremarkable.  No occlusion or significant stenosis.    No aneurysm.    IMPRESSION:         Normal head CTA.      Impression:            Communications:     Call Doctor Stroke    Electronically signed by  Prabhakar Fabian MD on 08-17-22 at 0551    CT Angiogram Neck [[id number]] Collected: 08/17/22 0553     Updated: 08/17/22 0627    Addenda:        ADDENDUM:  08 17 22 06:26 Call Doctor Regarding Stroke, called  Dr. Amina Beal on   08 17 06:26 (-04:00)      Signed: 08/17/22 0552 by Prabhakar Fabian MD    Narrative:      CR  Patient: DANIEL PARKS  Time Out: 05:52  Exam(s): CTA NECK     EXAM:    CT Neck With Intravenous Contrast    CLINICAL HISTORY:    Reason for exam: Stroke, follow up.    TECHNIQUE:    Axial computed tomographic images of the neck with intravenous contrast.    CTDI is  mGy and DLP is  mGy-cm.  This CT exam was performed according   to the principle of ALARA (As Low As Reasonably Achievable) by using one   or more of the following dose reduction techniques: automated exposure   control, adjustment of the mA and or kV according to patient size, and or   use of iterative reconstruction technique.    COMPARISON:    No relevant prior studies available.    FINDINGS:     VASCULATURE:    Right common carotid artery:  Unremarkable.  No significant stenosis.    No dissection or occlusion.    Right internal carotid artery:  Unremarkable.  Extracranial segment is   patent with no significant stenosis.  No dissection or occlusion.    Right external carotid artery:  Unremarkable.  No occlusion.    Right vertebral artery:  Unremarkable.  No significant stenosis.  No   dissection or occlusion.      Left common carotid artery:  Unremarkable.  No significant stenosis.    No dissection or occlusion.    Left internal carotid artery:  Less than 50% stenosis in the left   cervical ICA due to calcified plaque.  No dissection or occlusion.    Left external carotid artery:  Unremarkable.  No occlusion.    Left vertebral artery:  Unremarkable.  No significant stenosis.  No   dissection or occlusion.     NECK:    Bones joints:  No acute fracture.  No dislocation.    Soft tissues:  Unremarkable as visualized.  No  mass.     CAROTID STENOSIS REFERENCE USING NASCET CRITERIA:    % ICA stenosis = (1 - narrowest ICA diameter diameter of distal   cervical ICA) x 100.    Mild - <50% stenosis.    Moderate - 50-69% stenosis.    Severe - 70-94% stenosis.    Near occlusion - 95-99% stenosis.    Occluded - 100% stenosis.    IMPRESSION:         Less than 50% stenosis in the left cervical ICA due to calcified plaque.    Otherwise no acute findings on  CT angiography of the neck.      Impression:            Communications:     Call Doctor Stroke    Electronically signed by Prabhakar Fabian MD on 08-17-22 at 0552        --    Discharge Disposition  Home or Self Care    Discharge Medications     Discharge Medications      New Medications      Instructions Start Date   amLODIPine 10 MG tablet  Commonly known as: NORVASC   10 mg, Oral, Every 24 Hours Scheduled   Start Date: August 21, 2022     atorvastatin 40 MG tablet  Commonly known as: LIPITOR   40 mg, Oral, Nightly      clopidogrel 75 MG tablet  Commonly known as: Plavix   75 mg, Oral, Daily   Start Date: August 22, 2022        Continue These Medications      Instructions Start Date   aspirin 81 MG chewable tablet   81 mg, Oral, Daily             Discharge Diet: regular diet    Activity at Discharge:      Follow-up Information     Christopher Beltre MD Follow up in 4 week(s).    Specialty: Neurology  Contact information:  [address] [address]  [phone]                       See primary care provider, Provider, No Known, in 1-2 weeks        Test Results Pending at Discharge  Pending Labs     Order Current Status    Basic Metabolic Panel In process           Dylan Burton MD  [facility], [facility]  Pulmonary and Critical Care Medicine  08/20/22  10:20 EDT    Time: greater than 30 minutes.        Total time spent discharging patient including evaluation, post hospitalization follow up, medication and post hospitalization instructions and  education total time exceeds 30 minutes.

## 2022-08-20 NOTE — PROGRESS NOTES
"DOS: 2022  NAME: Gerry Campos   : 1965  PCP: Provider, No Known  Reason for consult: Cerebral hemorrhage    Stroke    Subjective: Patient notes very mild headache on the top of his head.  He reports that his right arm has been numb for couple weeks and he has had a discomfort in his left hip radiating to his knee for couple weeks as well.  He feels a little off balance but is able to walk independently.Pt seen in follow up today, however the problem is new to the examiner.      Objective:  Vital signs: /75 (BP Location: Right arm, Patient Position: Lying)   Pulse 62   Temp 98.3 °F (36.8 °C) (Oral)   Resp 17   Ht 180.3 cm (71\")   Wt 86.2 kg (190 lb)   SpO2 93%   BMI 26.50 kg/m²       General appearance: Well developed, well nourished, well groomed, alert and cooperative.  Appears older than stated age.  HEENT: Normocephalic.   Neck: Supple  Cardiac: Regular rate and rhythm. No murmurs.   Peripheral Vasculature: Radial pulses are equal and symmetric.  Chest Exam: Clear to auscultation bilaterally, no wheezes, no rhonchi.  Extremities: Normal, no edema.   Skin: No rashes or birthmarks.     Higher integrative function: Oriented to time, place, person, intact recent and remote memory, attention span, concentration and language. Spontaneous speech, fund of vocabulary are normal.   CN II: Normal  visual fields.   CN III IV VI: Extraocular movements are full without nystagmus. Pupils are equal, round, and reactive to light.  CN V: Normal facial sensation.  CN VII: Mild left facial droop.  CN VIII: Auditory acuity is normal.   CN IX & X: Symmetric palatal movement.   CN XI: Sternocleidomastoid and trapezius are normal. No weakness.   CN XII: The tongue is midline. No atrophy or fasciculations.   Motor: Left arm and leg 5- out of 5, right arm and leg 5 out of 5.  Left upper extremity drift noted.  No fasciculations, rigidity, spasticity or abnormal movements.   Sensation: Decreased to light touch " in left arm and leg.  Station and gait: Broad-based, cautious.  Coordination: Finger to nose test showed no dysmetria.  Heel to shin normal.     Scheduled Meds:amLODIPine, 10 mg, Oral, Q24H  aspirin, 81 mg, Oral, Daily  atorvastatin, 40 mg, Oral, Nightly  insulin regular, 0-14 Units, Subcutaneous, Q6H  lisinopril, 10 mg, Oral, Q24H  sodium chloride, 10 mL, Intravenous, Q12H      Continuous Infusions:   PRN Meds:.dextrose  •  dextrose  •  glucagon (human recombinant)  •  hydrALAZINE  •  HYDROcodone-acetaminophen  •  magnesium sulfate **OR** magnesium sulfate **OR** magnesium sulfate  •  ondansetron **OR** ondansetron  •  potassium chloride **OR** potassium chloride **OR** potassium chloride  •  potassium phosphate infusion greater than 15 mMoles **OR** potassium phosphate infusion greater than 15 mMoles **OR** potassium phosphate **OR** sodium phosphate IVPB **OR** sodium phosphate IVPB  •  sodium chloride    Laboratory results:  Lab Results   Component Value Date    GLUCOSE 123 (H) 08/20/2022    CALCIUM 8.9 08/20/2022     08/20/2022    K 3.8 08/20/2022    CO2 25.0 08/20/2022     08/20/2022    BUN 19 08/20/2022    CREATININE 0.83 08/20/2022    BCR 22.9 08/20/2022    ANIONGAP 13.0 08/20/2022     Lab Results   Component Value Date    WBC 6.74 08/20/2022    HGB 15.0 08/20/2022    HCT 43.0 08/20/2022    MCV 90.7 08/20/2022     08/20/2022     Lab Results   Component Value Date    CHOL 140 08/17/2022     Lab Results   Component Value Date    HDL 28 (L) 08/17/2022     Lab Results   Component Value Date    LDL 87 08/17/2022     Lab Results   Component Value Date    TRIG 137 08/17/2022         Lab 08/17/22  0321   HEMOGLOBIN A1C 5.50      Review and interpretation of imaging: CT head image viewed by me, shows small left subcortical hemorrhage.  MR brain images show by me, shows severe white matter disease, small strokes in the right temporal lobe and left cerebellum as well as small left subcortical  hemorrhage.  Adult Transthoracic Echo Complete W/ Cont if Necessary Per Protocol    Result Date: 8/19/2022  · Saline test results are negative. · Estimated left ventricular EF = 70% Left ventricular systolic function is normal. · Left ventricular diastolic function was normal. · There is mild calcification of the aortic valve. · Estimated right ventricular systolic pressure from tricuspid regurgitation is normal (<35 mmHg).      CT Head Without Contrast    Result Date: 8/18/2022  Patient: DANIEL PARKS  Time Out: 04:28 Exam(s): CT HEAD Without Contrast EXAM:   CT Head Without Intravenous Contrast CLINICAL HISTORY:    Reason for exam: f u ICH. TECHNIQUE:   Axial computed tomography images of the head brain without intravenous contrast.  CTDI is 54.8 mGy and DLP is 1023.8 mGy-cm.  This CT exam was performed according to the principle of ALARA (As Low As Reasonably Achievable) by using one or more of the following dose reduction techniques: automated exposure control, adjustment of the mA and or kV according to patient size, and or use of iterative reconstruction technique. COMPARISON:   8 17 2022 Findings: See impression IMPRESSION:       Similar hemorrhage in the left periventricular white matter. No herniation.    Electronically signed by Roxie Hamm MD on 08-18-22 at 0428    CT Angiogram Neck    Addendum Date: 8/17/2022    ADDENDUM: 08 17 22 06:26 Call Doctor Regarding Stroke, called  Dr. Amina Beal on 08 17 06:26 (-04:00)     Result Date: 8/17/2022  CR Patient: DANIEL PARKS  Time Out: 05:52 Exam(s): CTA NECK EXAM:   CT Neck With Intravenous Contrast CLINICAL HISTORY:   Reason for exam: Stroke, follow up. TECHNIQUE:   Axial computed tomographic images of the neck with intravenous contrast.   CTDI is  mGy and DLP is  mGy-cm.  This CT exam was performed according to the principle of ALARA (As Low As Reasonably Achievable) by using one or more of the following dose reduction techniques: automated exposure  control, adjustment of the mA and or kV according to patient size, and or use of iterative reconstruction technique. COMPARISON:   No relevant prior studies available. FINDINGS:  VASCULATURE:   Right common carotid artery:  Unremarkable.  No significant stenosis.  No dissection or occlusion.   Right internal carotid artery:  Unremarkable.  Extracranial segment is patent with no significant stenosis.  No dissection or occlusion.   Right external carotid artery:  Unremarkable.  No occlusion.   Right vertebral artery:  Unremarkable.  No significant stenosis.  No dissection or occlusion.   Left common carotid artery:  Unremarkable.  No significant stenosis.  No dissection or occlusion.   Left internal carotid artery:  Less than 50% stenosis in the left cervical ICA due to calcified plaque.  No dissection or occlusion.   Left external carotid artery:  Unremarkable.  No occlusion.   Left vertebral artery:  Unremarkable.  No significant stenosis.  No dissection or occlusion.  NECK:   Bones joints:  No acute fracture.  No dislocation.   Soft tissues:  Unremarkable as visualized.  No mass.  CAROTID STENOSIS REFERENCE USING NASCET CRITERIA:   % ICA stenosis = (1 - narrowest ICA diameter diameter of distal cervical ICA) x 100.   Mild - <50% stenosis.   Moderate - 50-69% stenosis.   Severe - 70-94% stenosis.   Near occlusion - 95-99% stenosis.   Occluded - 100% stenosis. IMPRESSION:       Less than 50% stenosis in the left cervical ICA due to calcified plaque.   Otherwise no acute findings on  CT angiography of the neck.     Communications:  Call Doctor Stroke Electronically signed by Prabhakar Fabian MD on 08-17-22 at 0552    MRI Brain With & Without Contrast    Result Date: 8/18/2022  Patient: DANIEL PARKS  Time Out: 02:11 Exam(s): MRI HEAD W WO Contrast IV Amt: 18 ml multihance EXAM:   MR Head Without and With Intravenous Contrast CLINICAL HISTORY:    Reason for exam: L hemispheric hemorrhage, looking for any  underlying cause. TECHNIQUE:   Magnetic resonance images of the head brain without and with intravenous contrast in multiple planes.  Mild motion artifact. 18 ml Multihance given IV. COMPARISON:   Head CT done earlier the same day. FINDINGS:   Brain: High T1 and low T2 signal in the left temporal lobe, corresponding to hyperdense focus by CT, presumably reflects late acute early subacute hemorrhage or hemorrhagic infarct, 8 x 8 x 6 mm, with mild surrounding edema.  No abnormal enhancement.  Probable small, acute infarct left inferior cerebellum, with equivocal corresponding ADC.  Punctate, acute infarct right temporal lobe.  No mass-effect or acute cortical infarct.  Moderate to severe, chronic white matter disease supratentorially and within the mesfin.  No herniation.  Mild diffuse atrophy.  No abnormal enhancement.   Ventricles:   No hydrocephalus or midline shift.   Bones joints:   No calvarial lesions.   Soft tissues: No scalp hematoma.   Sinuses: Clear.   Mastoid air cells:   No mastoid effusion. IMPRESSION:     1.  Late acute subacute focus of hemorrhage left temporal lobe, with mild surrounding edema.  No abnormal enhancement. 2.  Foci of acute infarct right temporal lobe and left cerebellum, nonhemorrhagic. 3.  Moderate to severe, chronic white matter disease. 4.  Given multifocal involvement of acute infarct, consider neurology consult and workup for possible cardiac or systemic etiology.     Electronically signed by Yeimi Pagan M.D. on 08-18-22 at 0211    CT Angiogram Head    Addendum Date: 8/17/2022    ADDENDUM: Patient: DANIEL PARKS  Time Out: 06:32 Exam(s): CTA HEAD  Added by Prabhakar Fabian MD on 8 17 2022 6:32 AM (-05:00) Addendum: Precontrast imaging was also performed as part of this examination.  It shows curvilinear density in the posterior cortex of the left insular cortex consistent with acute intraparenchymal hemorrhage.  The lack of availability of prior imaging at the time of this  interpretation limits the ability to comment on presence or absence of progression in hemorrhage.     Addendum Date: 8/17/2022    ADDENDUM: 08 17 22 06:27 Call Doctor Regarding Stroke, called  Dr. Amina Beal on 08 17 06:26 (-04:00)     Result Date: 8/17/2022  CR Patient: DANIEL PARKS  Time Out: 05:51 Exam(s): CTA HEAD EXAM:   CT Head With Intravenous Contrast CLINICAL HISTORY:    Reason for exam: Stroke, hemorrhagic. TECHNIQUE: AI analysis of LVO was utilized   Axial computed tomographic images of the head with intravenous contrast.   CTDI is 54.8 mGy and DLP is 1073.1 mGy-cm.  This CT exam was performed according to the principle of ALARA (As Low As Reasonably Achievable) by using one or more of the following dose reduction techniques: automated exposure control, adjustment of the mA and or kV according to patient size, and or use of iterative reconstruction technique. COMPARISON:   No relevant prior studies available. FINDINGS:   Right internal carotid artery:  No acute findings.  Intracranial segment is patent with no significant stenosis.  No aneurysm.   Right anterior cerebral artery:  Unremarkable.  No occlusion or significant stenosis.  No aneurysm.   Right middle cerebral artery:  Unremarkable.  No occlusion or significant stenosis.  No aneurysm.   Right posterior cerebral artery:  Unremarkable.  No occlusion or significant stenosis.  No aneurysm.   Right vertebral artery:  Unremarkable as visualized.   Left internal carotid artery:  No acute findings.  Intracranial segment is patent with no significant stenosis.  No aneurysm.   Left anterior cerebral artery:  Unremarkable.  No occlusion or significant stenosis.  No aneurysm.   Left middle cerebral artery:  Unremarkable.  No occlusion or significant stenosis.  No aneurysm.   Left posterior cerebral artery:  Unremarkable.  No occlusion or significant stenosis.  No aneurysm.   Left vertebral artery:  Unremarkable as visualized.   Basilar artery:   Unremarkable.  No occlusion or significant stenosis.  No aneurysm. IMPRESSION:       Normal head CTA.     Communications:  Call Doctor Stroke Electronically signed by Prabhakar Fabian MD on 08-17-22 at 0518    Most recent CT head 8/18    Impression:  57-year-old male, current smoker, with uncontrolled hypertension, hyperlipidemia, ADA, and history of rheumatoid arthritis who was admitted with stroke.  The patient states he had strokes in both hemispheres approximately 6 months ago and he was told that they were secondary to his blood pressure.  He was seen in the hospital in [facility] where he is from.  He was on DAPT for a while then Plavix was later stopped.  He states he has follow-up with a neurologist in [place].    He was admitted here 8/17 with complaints of dizziness with associated right upper extremity numbness and left lower extremity numbness and pain.  He was initially seen in [place] where he got a CT head showing a possible left insular ICH for which she was transferred here.  CTA head/neck here was unremarkable.  MRI brain with and without contrast showed right temporal lobe and left cerebellar ischemic strokes in addition to the left temporal subacute hemorrhage as well as fairly severe white matter disease.  Per Dr. Palacios the hemorrhage is felt to be ICH versus lacunar stroke with hypertensive transformation.  2D echo was unremarkable.  Hemoglobin A1c is 5.50 and LDL was 87    He was cleared by neurosurgery to restart his aspirin on 8/18 but they recommended waiting until 8/22 to start Plavix.    Diagnosis:  Binswanger disease (vascular dementia)  Uncontrolled hypertension  Tobacco abuse  Left subcortical hypertensive hemorrhage  Lacunar strokes    Comment: BP now well controlled, patient is neurologically stable and wants to be discharged.    Plan:  ASA 81 mg daily  Lipitor 40 mg daily  Okay to start Plavix 75 mg daily on 8/22 per neurosurgery recommendations.  He is at  significant risk for worsening white matter disease, further strokes, and dementia due to his uncontrolled hypertension and tobacco abuse.  He also has untreated ADA and I have recommended he have another sleep study so that he can be treated.  Neurochecks  Strict blood pressure control recommended, goal BP less than 130/80  Recommend smoking cessation  Stroke Education  TANK/SCDs  PT/OT/ST  Patient to follow-up with neurology.  He has a neurologist in [place] but we can see him if needed.  Okay for discharge from neurology standpoint.  Discussed with Dr. Palacios on today.

## 2022-08-20 NOTE — PLAN OF CARE
Pt A&O, vitals as documented. Treated once for pain per MAR. NIH scored a 0. Pt denies numbness or tingling. No falls. Pt denies chest pain or SOA. Pt is currently resting between care with his bed in the lowest position, bed alarm activated, and call light within reach. No signs of distress noted at this time.      Problem: Adult Inpatient Plan of Care  Goal: Plan of Care Review  Outcome: Ongoing, Progressing  Flowsheets (Taken 8/19/2022 1534 by Sera Sánchez, RN)  Plan of Care Reviewed With: patient  Goal: Patient-Specific Goal (Individualized)  Outcome: Ongoing, Progressing  Goal: Absence of Hospital-Acquired Illness or Injury  Outcome: Ongoing, Progressing  Intervention: Identify and Manage Fall Risk  Description: Perform standard risk assessment on admission using a validated tool or comprehensive approach appropriate to the patient; reassess fall risk frequently, with change in status or transfer to another level of care.  Communicate fall injury risk to interprofessional healthcare team.  Determine need for increased observation, equipment and environmental modification, such as low bed, signage and supportive, nonskid footwear.  Adjust safety measures to individual developmental age, stage and identified risk factors.  Reinforce the importance of safety and physical activity with patient and family.  Perform regular intentional rounding to assess need for position change, pain assessment and personal needs, including assistance with toileting.  Recent Flowsheet Documentation  Taken 8/20/2022 0440 by Filomena Vasquez, RN  Safety Promotion/Fall Prevention:   safety round/check completed   activity supervised   assistive device/personal items within reach   clutter free environment maintained   fall prevention program maintained   lighting adjusted   nonskid shoes/slippers when out of bed   room organization consistent  Taken 8/20/2022 0230 by Filomena Vasquez, RN  Safety Promotion/Fall Prevention:   safety  round/check completed   activity supervised   assistive device/personal items within reach   clutter free environment maintained   fall prevention program maintained   lighting adjusted   nonskid shoes/slippers when out of bed   room organization consistent  Taken 8/20/2022 0030 by Filomena Vasquez RN  Safety Promotion/Fall Prevention:   safety round/check completed   activity supervised   assistive device/personal items within reach   clutter free environment maintained   fall prevention program maintained   lighting adjusted   nonskid shoes/slippers when out of bed   room organization consistent  Taken 8/19/2022 2200 by Filomena Vasquez RN  Safety Promotion/Fall Prevention:   safety round/check completed   activity supervised   assistive device/personal items within reach   clutter free environment maintained   fall prevention program maintained   lighting adjusted   nonskid shoes/slippers when out of bed   room organization consistent  Taken 8/19/2022 2030 by Filomena Vasquez RN  Safety Promotion/Fall Prevention:   safety round/check completed   activity supervised   assistive device/personal items within reach   clutter free environment maintained   fall prevention program maintained   lighting adjusted   nonskid shoes/slippers when out of bed   room organization consistent  Intervention: Prevent Skin Injury  Description: Perform a screening for skin injury risk, such as pressure or moisture associated skin damage on admission and at regular intervals throughout hospital stay.  Keep all areas of skin (especially folds) clean and dry.  Maintain adequate skin hydration.  Relieve and redistribute pressure and protect bony prominences; implement measures based on patient-specific risk factors.  Match turning and repositioning schedule to clinical condition.  Encourage weight shift frequently; assist with reposition if unable to complete independently.  Float heels off bed; avoid pressure on the Achilles  tendon.  Keep skin free from extended contact with medical devices.  Encourage functional activity and mobility, as early as tolerated.  Use aids (e.g., slide boards, mechanical lift) during transfer.  Recent Flowsheet Documentation  Taken 8/20/2022 0440 by Filomena Vasquez RN  Body Position: position changed independently  Taken 8/20/2022 0230 by Filomena Vasquez RN  Body Position: position changed independently  Taken 8/20/2022 0030 by Filomena Vasquez RN  Body Position: position changed independently  Skin Protection: adhesive use limited  Taken 8/19/2022 2200 by Filomena Vasquez RN  Body Position:   position changed independently   side-lying   right  Taken 8/19/2022 2030 by Filomena Vasquez RN  Body Position: (up in chair) position changed independently  Skin Protection: adhesive use limited  Intervention: Prevent and Manage VTE (Venous Thromboembolism) Risk  Description: Assess for VTE (venous thromboembolism) risk.  Encourage and assist with early ambulation.  Initiate and maintain compression or other therapy, as indicated, based on identified risk in accordance with organizational protocol and provider order.  Encourage both active and passive leg exercises while in bed, if unable to ambulate.  Recent Flowsheet Documentation  Taken 8/20/2022 0440 by Filomena Vasquez RN  Activity Management: activity adjusted per tolerance  Taken 8/20/2022 0230 by Filomena Vasquez RN  Activity Management: activity adjusted per tolerance  Taken 8/20/2022 0030 by Filomena Vasquez RN  Activity Management: activity adjusted per tolerance  VTE Prevention/Management:   bilateral   sequential compression devices on  Range of Motion: active ROM (range of motion) encouraged  Taken 8/19/2022 2200 by Filomena Vasquez RN  Activity Management: activity adjusted per tolerance  Taken 8/19/2022 2030 by Filomena Vasquez RN  Activity Management: up in chair  VTE Prevention/Management:   bilateral   sequential compression devices  on  Range of Motion: ROM (range of motion) performed  Intervention: Prevent Infection  Description: Maintain skin and mucous membrane integrity; promote hand, oral and pulmonary hygiene.  Optimize fluid balance, nutrition, sleep and glycemic control to maximize infection resistance.  Identify potential sources of infection early to prevent or mitigate progression of infection (e.g., wound, lines, devices).  Evaluate ongoing need for invasive devices; remove promptly when no longer indicated.  Recent Flowsheet Documentation  Taken 8/20/2022 0440 by Filomena Vasquez RN  Infection Prevention:   hand hygiene promoted   rest/sleep promoted   single patient room provided   visitors restricted/screened  Taken 8/20/2022 0230 by Filomena Vasquez RN  Infection Prevention:   hand hygiene promoted   rest/sleep promoted   single patient room provided   visitors restricted/screened  Taken 8/20/2022 0030 by Filomena Vasquez RN  Infection Prevention:   hand hygiene promoted   rest/sleep promoted   single patient room provided   visitors restricted/screened  Taken 8/19/2022 2200 by Filomena Vasquez RN  Infection Prevention:   hand hygiene promoted   rest/sleep promoted   single patient room provided   visitors restricted/screened  Taken 8/19/2022 2030 by Filomena Vasquez RN  Infection Prevention:   hand hygiene promoted   rest/sleep promoted   single patient room provided   visitors restricted/screened  Goal: Optimal Comfort and Wellbeing  Outcome: Ongoing, Progressing  Intervention: Monitor Pain and Promote Comfort  Description: Assess pain level, treatment efficacy and patient response at regular intervals using a consistent pain scale.  Consider the presence and impact of preexisting chronic pain.  Encourage patient and caregiver involvement in pain assessment, interventions and safety measures.  Recent Flowsheet Documentation  Taken 8/19/2022 2200 by Filomena Vasquez RN  Pain Management Interventions:   see MAR    diversional activity provided   care clustered   breathing exercises   quiet environment facilitated   relaxation techniques promoted   pillow support provided   position adjusted  Taken 8/19/2022 2030 by Filomena Vasquez RN  Pain Management Interventions:   pain management plan reviewed with patient/caregiver   quiet environment facilitated   relaxation techniques promoted   position adjusted   pillow support provided   care clustered   breathing exercises   diversional activity provided  Intervention: Provide Person-Centered Care  Description: Use a family-focused approach to care.  Develop trust and rapport by proactively providing information, encouraging questions, addressing concerns and offering reassurance.  Acknowledge emotional response to hospitalization.  Recognize and utilize personal coping strategies.  Honor spiritual and cultural preferences.  Recent Flowsheet Documentation  Taken 8/20/2022 0030 by Filomena Vasquez RN  Trust Relationship/Rapport:   care explained   choices provided   emotional support provided   empathic listening provided   questions encouraged   questions answered   reassurance provided   thoughts/feelings acknowledged  Taken 8/19/2022 2030 by Filomena Vasquez RN  Trust Relationship/Rapport:   care explained   choices provided   emotional support provided   empathic listening provided   questions encouraged   questions answered   reassurance provided   thoughts/feelings acknowledged  Goal: Readiness for Transition of Care  Outcome: Ongoing, Progressing

## 2022-08-20 NOTE — PROGRESS NOTES
Doing well out of intensive care.  Blood pressure at goal.  He is hoping to go home.  Awaiting to hear from neurology service.

## 2022-08-21 NOTE — OUTREACH NOTE
Prep Survey    Flowsheet Row Responses   [facility] patient discharged from? [place]   Is LACE score < 7 ? No   Emergency Room discharge w/ pulse ox? No   Eligibility Readm Mgmt   Discharge diagnosis [facility] (intracerebral hemorrhage   Does the patient have one of the following disease processes/diagnoses(primary or secondary)? Stroke (TIA)   Does the patient have Home health ordered? No   Is there a DME ordered? No   Prep survey completed? Yes          DEVORA LÓPEZ - Registered Nurse

## 2022-08-24 ENCOUNTER — READMISSION MANAGEMENT (OUTPATIENT)
Dept: CALL CENTER | Facility: HOSPITAL | Age: 57
End: 2022-08-24

## 2022-08-24 NOTE — OUTREACH NOTE
Stroke Week 1 Survey    Flowsheet Row Responses   [facility] facility patient discharged from? [place]   Does the patient have one of the following disease processes/diagnoses(primary or secondary)? Stroke (TIA)   Week 1 attempt successful? No   Unsuccessful attempts Attempt 1          SAIDA Paredes Registered Nurse

## 2022-08-29 LAB — GLUCOSE BLDC GLUCOMTR-MCNC: 119 MG/DL (ref 70–130)

## 2022-09-02 ENCOUNTER — READMISSION MANAGEMENT (OUTPATIENT)
Dept: CALL CENTER | Facility: HOSPITAL | Age: 57
End: 2022-09-02

## 2022-09-02 NOTE — OUTREACH NOTE
Stroke Week 2 Survey    Flowsheet Row Responses   [facility] facility patient discharged from? [place]   Does the patient have one of the following disease processes/diagnoses(primary or secondary)? Stroke   Week 2 attempt successful? No   Unsuccessful attempts Attempt 1          ANIA HARDY - Registered Nurse

## 2022-09-06 ENCOUNTER — READMISSION MANAGEMENT (OUTPATIENT)
Dept: CALL CENTER | Facility: HOSPITAL | Age: 57
End: 2022-09-06

## 2022-09-06 NOTE — OUTREACH NOTE
Stroke Week 2 Survey    Flowsheet Row Responses   [facility] facility patient discharged from? [place]   Does the patient have one of the following disease processes/diagnoses(primary or secondary)? Stroke   Week 2 attempt successful? No   Unsuccessful attempts Attempt 2          KELI LÓPEZ - Registered Nurse